# Patient Record
Sex: FEMALE | Race: WHITE | NOT HISPANIC OR LATINO | Employment: OTHER | URBAN - METROPOLITAN AREA
[De-identification: names, ages, dates, MRNs, and addresses within clinical notes are randomized per-mention and may not be internally consistent; named-entity substitution may affect disease eponyms.]

---

## 2017-01-05 ENCOUNTER — TELEPHONE (OUTPATIENT)
Dept: NEUROLOGY | Facility: MEDICAL CENTER | Age: 77
End: 2017-01-05

## 2017-01-05 DIAGNOSIS — R25.1 TREMOR: ICD-10-CM

## 2017-01-05 RX ORDER — PROPRANOLOL HYDROCHLORIDE 120 MG/1
120 CAPSULE, EXTENDED RELEASE ORAL DAILY
Qty: 30 CAP | Refills: 11 | Status: SHIPPED | OUTPATIENT
Start: 2017-01-05 | End: 2017-05-15 | Stop reason: SDUPTHER

## 2017-01-05 NOTE — TELEPHONE ENCOUNTER
Please call the patient and tell her I spoke with her cardiologist, we can substitute Inderal  mg daily for the Toprol-XL 75 mg tablets. A prescription is at the pharmacy, tell her simply to start the Inderal and stop Toprol that very day. JS      Called and spoke with pt. She verbally understood and will comply with all given. LUMA

## 2017-02-13 ENCOUNTER — ANTICOAGULATION VISIT (OUTPATIENT)
Dept: VASCULAR LAB | Facility: MEDICAL CENTER | Age: 77
End: 2017-02-13
Attending: NURSE PRACTITIONER
Payer: MEDICARE

## 2017-02-13 DIAGNOSIS — I48.91 ATRIAL FIBRILLATION, UNSPECIFIED TYPE (HCC): ICD-10-CM

## 2017-02-13 DIAGNOSIS — Z86.73 HISTORY OF EMBOLIC STROKE: ICD-10-CM

## 2017-02-13 LAB
INR BLD: 3.6 (ref 0.9–1.2)
INR PPP: 3.6 (ref 2–3.5)

## 2017-02-13 PROCEDURE — 99212 OFFICE O/P EST SF 10 MIN: CPT | Performed by: NURSE PRACTITIONER

## 2017-02-13 PROCEDURE — 85610 PROTHROMBIN TIME: CPT

## 2017-02-13 NOTE — PROGRESS NOTES
Anticoagulation Summary as of 2/13/2017     INR goal 2.0-3.0   Selected INR 3.6! (2/13/2017)   Maintenance plan 4 mg (4 mg x 1) every day   Weekly total 28 mg   Plan last modified John Moore, PHARMD (10/17/2016)   Next INR check 4/10/2017   Target end date Indefinite    Indications   History of embolic stroke [Z86.79]  Atrial fibrillation (CMS-HCC) [I48.91]         Anticoagulation Episode Summary     [facility] check location [facility]    Preferred lab     Send INR reminders to     Comments       Anticoagulation Care Providers     Provider Role Specialty Phone number    Anuj Denise M.D. Referring Cardiology [phone]    [facility]   [phone]        Patient is supratherapeutic today. She ran out of 4 mg tablets and has been taking 5 mg daily. She now has 4 mg tablets at home. Denies any medication or diet changes. No current symptoms of bleeding or thrombosis reported. HOLD tonight then resume previous regimen of 4 mg daily. Follow up in 8 weeks per pt's preference.    Next Appointment: Monday, April 10, 2017 at   3:00 pm.    Arabella CHANEY

## 2017-02-13 NOTE — MR AVS SNAPSHOT
Aria Solis   2017 3:00 PM   Anticoagulation Visit   MRN: [id number]    Department:  Vascular Medicine   Dept Phone:  [phone]    Description:  Female : 1940   Provider:  [facility] EXAM 5           Allergies as of 2017     Allergen Noted Reactions    Darvon [Propoxyphene Hcl] 10/06/2010       Gluten Meal 01/10/2016         You were diagnosed with     Atrial fibrillation, unspecified type (CMS-HCC)   [4569193]       History of embolic stroke   [691921]         Vital Signs     Smoking Status                   Former Smoker           Basic Information     Date Of Birth Sex Race Ethnicity Preferred Language    1940 Female White Non- English      Your appointments     Apr 10, 2017  3:00 PM   Established Patient with [facility] EXAM 4   [facility]  (--)    [address] [facility] [address]   [phone]            May 08, 2017 12:45 PM   FOLLOW UP with Anuj Denise M.D.   [facility]    [address]  [facility] [id number] [phone]              Problem List              ICD-10-CM Priority Class Noted - Resolved    Insomnia G47.00 Low  Unknown - Present    Osteoarthritis of more than one site M15.9 Low  Unknown - Present    Neuropathy (CMS-[facility]) G62.9 Low  2011 - Present    Radicular pain- L1 nerve root impingement M54.10 Medium  2012 - Present    Atrial fibrillation (CMS-HCC) I48.91 High  2014 - Present    Coronary atherosclerosis of native coronary artery I25.10 High  2014 - Present    Moderate mitral regurgitation I34.0 Medium  2014 - Present    Aortic valve sclerosis I35.8 High  2014 - Present    Intractable migraine without aura G43.019   Unknown - Present    History of embolic stroke Z86.79 Medium  2014 - Present    Mixed hyperlipidemia E78.2 High  2014 - Present    Chronic anticoagulation Z79.01 High   6/19/2014 - Present    Lumbosacral spondylosis without myelopathy M47.817   11/18/2014 - Present    Localized adiposity of abdomen E65   7/5/2015 - Present    ANDRE on CPAP G47.33 Medium  8/27/2015 - Present    CKD (chronic kidney disease) stage 3, GFR 30-59 ml/min (Chronic) N18.3 Medium  10/12/2015 - Present    Subclinical hypothyroidism E03.9 Low  10/12/2015 - Present    Chronically dry eyes H04.129   10/12/2015 - Present    Macular degeneration of both eyes H35.30   10/12/2015 - Present    Medical home Z78.9   Unknown - Present    Edema R60.9 Medium  4/27/2016 - Present    Chronic systolic congestive heart failure (CMS-[facility]) I50.22 Medium  5/2/2016 - Present    Obesity (BMI 30-39.9) E66.9   5/2/2016 - Present    Nausea R11.0   10/6/2016 - Present    Tremor R25.1   10/6/2016 - Present      Health Maintenance        Date Due Completion Dates    IMM DTaP/Tdap/Td Vaccine (1 - Tdap) 7/4/1959 ---    IMM ZOSTER VACCINE 7/4/2000 ---    BONE DENSITY 7/4/2005 ---    MAMMOGRAM 11/22/2017 11/22/2016, 1/8/2014, 1/8/2014, 1/8/2014, 12/12/2013    COLONOSCOPY 4/10/2018 4/10/2015            Results     POCT Protime      Component    INR    3.6                        Current Immunizations     13-VALENT PCV PREVNAR 1/21/2016  8:39 AM    Influenza TIV (IM) 9/1/2013, 10/29/2011    Influenza Vaccine Adult HD 10/6/2016, 9/28/2015  3:34 PM, 9/27/2014    Pneumococcal polysaccharide vaccine (PPSV-23) 9/1/2013, 11/29/2011      Below and/or attached are the medications your provider expects you to take. Review all of your home medications and newly ordered medications with your provider and/or pharmacist. Follow medication instructions as directed by your provider and/or pharmacist. Please keep your medication list with you and share with your provider. Update the information when medications are discontinued, doses are changed, or new medications (including over-the-counter products) are added; and carry medication information at all times in  the event of emergency situations     Allergies:  DARVON - (reactions not documented)     GLUTEN MEAL - (reactions not documented)               Medications  Valid as of: February 13, 2017 -  3:09 PM    Generic Name Brand Name Tablet Size Instructions for use    Aspirin (Tablet Delayed Response) aspirin 81 MG Take 1 Tab by mouth every day.        Atorvastatin Calcium (Tab) LIPITOR 10 MG Take 1 Tab by mouth every bedtime.        Beclomethasone Dipropionate (Aero Soln) QVAR 40 MCG/ACT Inhale 1 Puff by mouth 2 Times a Day.        CycloSPORINE (Emulsion) RESTASIS 0.05 % Place 1 Drop in both eyes 2 times a day.        Divalproex Sodium (TABLET SR 24 HR) DEPAKOTE  MG Take 1 Tab by mouth 2 Times a Day.        Furosemide (Tab) LASIX 20 MG Take 1 Tab by mouth every day.        Gabapentin (Tab) NEURONTIN 600 MG Take 1 Tab by mouth 2 times a day.        Multiple Vitamins-Minerals   Take  by mouth every day.        Propranolol HCl (CAPSULE SR 24 HR) INDERAL  MG Take 1 Cap by mouth every day.        Warfarin Sodium (Tab) COUMADIN 4 MG Take one tablet daily as directed by coumadin clinic        .                 Medicines prescribed today were sent to:     [email or web address] - [address] [address]    Phone: [phone] Fax: [phone]    Open 24 Hours?: No    [facility]ACY #[id number] - [place], [place] - [address] [facility]    [address] [facility] [place] [address]    Phone: [phone] Fax: [phone]    Open 24 Hours?: No      Medication refill instructions:       If your prescription bottle indicates you have medication refills left, it is not necessary to call your provider’s office. Please contact your pharmacy and they will refill your medication.    If your prescription bottle indicates you do not have any refills left, you may request refills at any time through one of the following ways: The online [employer] system (except Urgent Care), by calling your  provider’s office, or by asking your pharmacy to contact your provider’s office with a refill request. Medication refills are processed only during regular business hours and may not be available until the next business day. Your provider may request additional information or to have a follow-up visit with you prior to refilling your medication.   *Please Note: Medication refills are assigned a new Rx number when refilled electronically. Your pharmacy may indicate that no refills were authorized even though a new prescription for the same medication is available at the pharmacy. Please request the medicine by name with the pharmacy before contacting your provider for a refill.        Other Notes About Your Plan     Enrolled in Medical [place] at [facility]        Warfarin Dosing Calendar   February 2017 Details    Sun Mon Tue Wed Thu Fri Sat        1               2               3               4                 5               6               7               8               9               10               11                 12               13   3.6   Hold   See details      14      4 mg         15      4 mg         16      4 mg         17      4 mg         18      4 mg           19      4 mg         20      4 mg         21      4 mg         22      4 mg         23      4 mg         24      4 mg         25      4 mg           26      4 mg         27      4 mg         28      4 mg              Date Details   02/13 This INR check   INR: 3.6               How to take your warfarin dose     To take:  4 mg Take 1 of the 4 mg tablets.    Hold Do not take your warfarin dose. See the Details table to the right for additional instructions.                Warfarin Dosing Calendar   March 2017 Details    Sun Mon Tue Wed Thu Fri Sat        1      4 mg         2      4 mg         3      4 mg         4      4 mg           5      4 mg         6      4 mg         7      4 mg         8      4 mg         9      4 mg         10        4 mg         11      4 mg           12      4 mg         13      4 mg         14      4 mg         15      4 mg         16      4 mg         17      4 mg         18      4 mg           19      4 mg         20      4 mg         21      4 mg         22      4 mg         23      4 mg         24      4 mg         25      4 mg           26      4 mg         27      4 mg         28      4 mg         29      4 mg         30      4 mg         31      4 mg           Date Details   No additional details            How to take your warfarin dose     To take:  4 mg Take 1 of the 4 mg tablets.           Warfarin Dosing Calendar   April 2017 Details    Sun Mon Tue Wed Thu Fri Sat           1      4 mg           2      4 mg         3      4 mg         4      4 mg         5      4 mg         6      4 mg         7      4 mg         8      4 mg           9      4 mg         10      4 mg         11               12               13               14               15                 16               17               18               19               20               21               22                 23               24               25               26               27               28               29                 30                      Date Details   No additional details    Date of next INR:  4/10/2017         How to take your warfarin dose     To take:  4 mg Take 1 of the 4 mg tablets.              [employer] Access Code: Activation code not generated  Current [employer] Status: Active

## 2017-03-02 DIAGNOSIS — I48.91 ATRIAL FIBRILLATION, UNSPECIFIED TYPE (HCC): ICD-10-CM

## 2017-04-10 ENCOUNTER — ANTICOAGULATION VISIT (OUTPATIENT)
Dept: VASCULAR LAB | Facility: MEDICAL CENTER | Age: 77
End: 2017-04-10
Attending: INTERNAL MEDICINE
Payer: MEDICARE

## 2017-04-10 DIAGNOSIS — I48.91 ATRIAL FIBRILLATION, UNSPECIFIED TYPE (HCC): ICD-10-CM

## 2017-04-10 DIAGNOSIS — Z86.73 HISTORY OF EMBOLIC STROKE: ICD-10-CM

## 2017-04-10 LAB
INR BLD: 1.7 (ref 0.9–1.2)
INR PPP: 1.7 (ref 2–3.5)

## 2017-04-10 PROCEDURE — 85610 PROTHROMBIN TIME: CPT

## 2017-04-10 PROCEDURE — 99212 OFFICE O/P EST SF 10 MIN: CPT | Performed by: NURSE PRACTITIONER

## 2017-04-10 NOTE — PROGRESS NOTES
Anticoagulation Summary as of 4/10/2017     INR goal 2.0-3.0   Selected INR 1.7! (4/10/2017)   Maintenance plan 7.5 mg (5 mg x 1.5) on Mon; 5 mg (5 mg x 1) all other days   Weekly total 37.5 mg   Plan last modified TORI Jackson (4/10/2017)   Next INR check 4/24/2017   Target end date Indefinite    Indications   History of embolic stroke [Z86.79]  Atrial fibrillation (CMS-HCC) [I48.91]         Anticoagulation Episode Summary     [facility] location [facility]    Preferred lab     Send INR reminders to     Comments       Anticoagulation Care Providers     Provider Role Specialty Phone number    Anuj Denise M.D. Referring Cardiology [phone]    [facility]   [phone]        Patient is subtherapeutic today. She ran out of 4 mg tablets and is using 5 mg tablets - take 5 mg daily. Denies any medication or diet changes and no missed doses. No current symptoms of bleeding or thrombosis reported. Will increase regimen. Follow up in 2 weeks.    Next Appointment: Monday, April 24, 2017 at 4:00 pm.    Arabella CHANEY

## 2017-04-10 NOTE — MR AVS SNAPSHOT
Aria Solis   4/10/2017 4:45 PM   Anticoagulation Visit   MRN: [id number]    Department:  Vascular Medicine   Dept Phone:  [phone]    Description:  Female : 1940   Provider:  [facility] EXAM 4           Allergies as of 4/10/2017     Allergen Noted Reactions    Darvon [Propoxyphene Hcl] 10/06/2010       Gluten Meal 01/10/2016         You were diagnosed with     Atrial fibrillation, unspecified type (CMS-HCC)   [0405829]       History of embolic stroke   [972479]         Vital Signs     Smoking Status                   Former Smoker           Basic Information     Date Of Birth Sex Race Ethnicity Preferred Language    1940 Female White Non- English      Your appointments     2017  4:00 PM   Established Patient with [facility] EXAM 4   [facility]  (--)    [address] [facility] [address]   [phone]            May 08, 2017 12:45 PM   FOLLOW UP with Anuj Denise M.D.   [facility]    [address]  [address]  [facility] [id number] [phone]              Problem List              ICD-10-CM Priority Class Noted - Resolved    Insomnia G47.00 Low  Unknown - Present    Osteoarthritis of more than one site M15.9 Low  Unknown - Present    Neuropathy (CMS-HCC) G62.9 Low  2011 - Present    Radicular pain- L1 nerve root impingement M54.10 Medium  2012 - Present    Atrial fibrillation (CMS-HCC) I48.91 High  2014 - Present    Coronary atherosclerosis of native coronary artery I25.10 High  2014 - Present    Moderate mitral regurgitation I34.0 Medium  2014 - Present    Aortic valve sclerosis I35.8 High  2014 - Present    Intractable migraine without aura G43.019   Unknown - Present    History of embolic stroke Z86.79 Medium  2014 - Present    Mixed hyperlipidemia E78.2 High  2014 - Present    Chronic anticoagulation Z79.01  High  6/19/2014 - Present    Lumbosacral spondylosis without myelopathy M47.817   11/18/2014 - Present    Localized adiposity of abdomen E65   7/5/2015 - Present    ANDRE on CPAP G47.33 Medium  8/27/2015 - Present    CKD (chronic kidney disease) stage 3, GFR 30-59 ml/min (Chronic) N18.3 Medium  10/12/2015 - Present    Subclinical hypothyroidism E03.9 Low  10/12/2015 - Present    Chronically dry eyes H04.129   10/12/2015 - Present    Macular degeneration of both eyes H35.30   10/12/2015 - Present    Medical home Z78.9   Unknown - Present    Edema R60.9 Medium  4/27/2016 - Present    Chronic systolic congestive heart failure (CMS-[facility]) I50.22 Medium  5/2/2016 - Present    Obesity (BMI 30-39.9) E66.9   5/2/2016 - Present    Nausea R11.0   10/6/2016 - Present    Tremor R25.1   10/6/2016 - Present      Health Maintenance        Date Due Completion Dates    IMM DTaP/Tdap/Td Vaccine (1 - Tdap) 7/4/1959 ---    IMM ZOSTER VACCINE 7/4/2000 ---    BONE DENSITY 7/4/2005 ---    MAMMOGRAM 11/22/2017 11/22/2016, 1/8/2014, 1/8/2014, 1/8/2014, 12/12/2013    COLONOSCOPY 4/10/2018 4/10/2015            Results     POCT Protime      Component    INR    1.7                        Current Immunizations     13-VALENT PCV PREVNAR 1/21/2016  8:39 AM    Influenza TIV (IM) 9/1/2013, 10/29/2011    Influenza Vaccine Adult HD 10/6/2016, 9/28/2015  3:34 PM, 9/27/2014    Pneumococcal polysaccharide vaccine (PPSV-23) 9/1/2013, 11/29/2011      Below and/or attached are the medications your provider expects you to take. Review all of your home medications and newly ordered medications with your provider and/or pharmacist. Follow medication instructions as directed by your provider and/or pharmacist. Please keep your medication list with you and share with your provider. Update the information when medications are discontinued, doses are changed, or new medications (including over-the-counter products) are added; and carry medication information at all  times in the event of emergency situations     Allergies:  DARVON - (reactions not documented)     GLUTEN MEAL - (reactions not documented)               Medications  Valid as of: April 10, 2017 -  4:50 PM    Generic Name Brand Name Tablet Size Instructions for use    Aspirin (Tablet Delayed Response) aspirin 81 MG Take 1 Tab by mouth every day.        Atorvastatin Calcium (Tab) LIPITOR 10 MG Take 1 Tab by mouth every bedtime.        Beclomethasone Dipropionate (Aero Soln) QVAR 40 MCG/ACT Inhale 1 Puff by mouth 2 Times a Day.        CycloSPORINE (Emulsion) RESTASIS 0.05 % Place 1 Drop in both eyes 2 times a day.        Divalproex Sodium (TABLET SR 24 HR) DEPAKOTE  MG Take 1 Tab by mouth 2 Times a Day.        Furosemide (Tab) LASIX 20 MG Take 1 Tab by mouth every day.        Gabapentin (Tab) NEURONTIN 600 MG Take 1 Tab by mouth 2 times a day.        Multiple Vitamins-Minerals   Take  by mouth every day.        Propranolol HCl (CAPSULE SR 24 HR) INDERAL  MG Take 1 Cap by mouth every day.        Warfarin Sodium (Tab) COUMADIN 4 MG Take one tablet daily as directed by coumadin clinic        .                 Medicines prescribed today were sent to:     [facility] - [address] [address]    Phone: [phone] Fax: [phone]    Open 24 Hours?: No    [facility]/PHARMACY #[id number] - [place] - [address] [facility] [address] [facility]    [address] [place] [address]    Phone: [phone] Fax: [phone]    Open 24 Hours?: No      Medication refill instructions:       If your prescription bottle indicates you have medication refills left, it is not necessary to call your provider’s office. Please contact your pharmacy and they will refill your medication.    If your prescription bottle indicates you do not have any refills left, you may request refills at any time through one of the following ways: The online [employer] system (except Urgent Care), by calling  your provider’s office, or by asking your pharmacy to contact your provider’s office with a refill request. Medication refills are processed only during regular business hours and may not be available until the next business day. Your provider may request additional information or to have a follow-up visit with you prior to refilling your medication.   *Please Note: Medication refills are assigned a new Rx number when refilled electronically. Your pharmacy may indicate that no refills were authorized even though a new prescription for the same medication is available at the pharmacy. Please request the medicine by name with the pharmacy before contacting your provider for a refill.        Other Notes About Your Plan     Enrolled in Medical [place] at [facility]        Warfarin Dosing Calendar   April 2017 Details    Sun Mon Tue Wed Thu Fri Sat           1                 2               3               4               5               6               7               8                 9               10   1.7   7.5 mg   See details      11      5 mg         12      5 mg         13      5 mg         14      5 mg         15      5 mg           16      5 mg         17      7.5 mg         18      5 mg         19      5 mg         20      5 mg         21      5 mg         22      5 mg           23      5 mg         24      7.5 mg         25               26               27               28               29                 30                      Date Details   04/10 This INR check   INR: 1.7       Date of next INR:  4/24/2017         How to take your warfarin dose     To take:  5 mg Take 1 of the 5 mg tablets.    To take:  7.5 mg Take 1.5 of the 5 mg tablets.              [employer] Access Code: Activation code not generated  Current [employer] Status: Active

## 2017-04-24 ENCOUNTER — ANTICOAGULATION VISIT (OUTPATIENT)
Dept: VASCULAR LAB | Facility: MEDICAL CENTER | Age: 77
End: 2017-04-24
Attending: INTERNAL MEDICINE
Payer: MEDICARE

## 2017-04-24 DIAGNOSIS — I48.91 ATRIAL FIBRILLATION, UNSPECIFIED TYPE (HCC): ICD-10-CM

## 2017-04-24 DIAGNOSIS — Z86.73 HISTORY OF EMBOLIC STROKE: ICD-10-CM

## 2017-04-24 LAB — INR PPP: 4.3 (ref 2–3.5)

## 2017-04-24 PROCEDURE — 85610 PROTHROMBIN TIME: CPT

## 2017-04-24 PROCEDURE — 99212 OFFICE O/P EST SF 10 MIN: CPT | Performed by: NURSE PRACTITIONER

## 2017-04-24 NOTE — MR AVS SNAPSHOT
Aria Solis   2017 4:00 PM   Anticoagulation Visit   MRN: [id number]    Department:  Vascular Medicine   Dept Phone:  [phone]    Description:  Female : 1940   Provider:  [facility] EXAM 4           Allergies as of 2017     Allergen Noted Reactions    Darvon [Propoxyphene Hcl] 10/06/2010       Gluten Meal 01/10/2016         You were diagnosed with     Atrial fibrillation, unspecified type (CMS-HCC)   [9870246]       History of embolic stroke   [538374]         Vital Signs     Smoking Status                   Former Smoker           Basic Information     Date Of Birth Sex Race Ethnicity Preferred Language    1940 Female White Non- English      Your appointments     May 15, 2017  2:15 PM   FOLLOW UP with Anuj Denise M.D.   [facility]    [address]  [address]  [facility] [id number] [phone]            May 15, 2017  4:00 PM   Established Patient with [facility] EXAM 5   [facility] for Heart and Vascular Health  (--)    [address] [facility] [address]   [phone]              Problem List              ICD-10-CM Priority Class Noted - Resolved    Insomnia G47.00 Low  Unknown - Present    Osteoarthritis of more than one site M15.9 Low  Unknown - Present    Neuropathy (CMS-[facility]) G62.9 Low  2011 - Present    Radicular pain- L1 nerve root impingement M54.10 Medium  2012 - Present    Atrial fibrillation (CMS-HCC) I48.91 High  2014 - Present    Coronary atherosclerosis of native coronary artery I25.10 High  2014 - Present    Moderate mitral regurgitation I34.0 Medium  2014 - Present    Aortic valve sclerosis I35.8 High  2014 - Present    Intractable migraine without aura G43.019   Unknown - Present    History of embolic stroke Z86.79 Medium  2014 - Present    Mixed hyperlipidemia E78.2 High  2014 - Present    Chronic anticoagulation Z79.01  High  6/19/2014 - Present    Lumbosacral spondylosis without myelopathy M47.817   11/18/2014 - Present    Localized adiposity of abdomen E65   7/5/2015 - Present    ANDRE on CPAP G47.33 Medium  8/27/2015 - Present    CKD (chronic kidney disease) stage 3, GFR 30-59 ml/min (Chronic) N18.3 Medium  10/12/2015 - Present    Subclinical hypothyroidism E03.9 Low  10/12/2015 - Present    Chronically dry eyes H04.129   10/12/2015 - Present    Macular degeneration of both eyes H35.30   10/12/2015 - Present    Medical home Z78.9   Unknown - Present    Edema R60.9 Medium  4/27/2016 - Present    Chronic systolic congestive heart failure (CMS-[facility]) I50.22 Medium  5/2/2016 - Present    Obesity (BMI 30-39.9) E66.9   5/2/2016 - Present    Nausea R11.0   10/6/2016 - Present    Tremor R25.1   10/6/2016 - Present      Health Maintenance        Date Due Completion Dates    IMM DTaP/Tdap/Td Vaccine (1 - Tdap) 7/4/1959 ---    IMM ZOSTER VACCINE 7/4/2000 ---    BONE DENSITY 7/4/2005 ---    MAMMOGRAM 11/22/2017 11/22/2016, 12/12/2013    COLONOSCOPY 4/10/2018 4/10/2015            Results     POCT Protime      Component    INR    4.3                        Current Immunizations     13-VALENT PCV PREVNAR 1/21/2016  8:39 AM    Influenza TIV (IM) 9/1/2013, 10/29/2011    Influenza Vaccine Adult HD 10/6/2016, 9/28/2015  3:34 PM, 9/27/2014    Pneumococcal polysaccharide vaccine (PPSV-23) 9/1/2013, 11/29/2011      Below and/or attached are the medications your provider expects you to take. Review all of your home medications and newly ordered medications with your provider and/or pharmacist. Follow medication instructions as directed by your provider and/or pharmacist. Please keep your medication list with you and share with your provider. Update the information when medications are discontinued, doses are changed, or new medications (including over-the-counter products) are added; and carry medication information at all times in the event of emergency  situations     Allergies:  DARVON - (reactions not documented)     GLUTEN MEAL - (reactions not documented)               Medications  Valid as of: April 24, 2017 -  4:19 PM    Generic Name Brand Name Tablet Size Instructions for use    Aspirin (Tablet Delayed Response) aspirin 81 MG Take 1 Tab by mouth every day.        Atorvastatin Calcium (Tab) LIPITOR 10 MG Take 1 Tab by mouth every bedtime.        Beclomethasone Dipropionate (Aero Soln) QVAR 40 MCG/ACT Inhale 1 Puff by mouth 2 Times a Day.        CycloSPORINE (Emulsion) RESTASIS 0.05 % Place 1 Drop in both eyes 2 times a day.        Divalproex Sodium (TABLET SR 24 HR) DEPAKOTE  MG Take 1 Tab by mouth 2 Times a Day.        Furosemide (Tab) LASIX 20 MG Take 1 Tab by mouth every day.        Gabapentin (Tab) NEURONTIN 600 MG Take 1 Tab by mouth 2 times a day.        Multiple Vitamins-Minerals   Take  by mouth every day.        Propranolol HCl (CAPSULE SR 24 HR) INDERAL  MG Take 1 Cap by mouth every day.        Warfarin Sodium (Tab) COUMADIN 4 MG Take one tablet daily as directed by coumadin clinic        .                 Medicines prescribed today were sent to:     [facility] [address] [address]    Phone: [phone] Fax: [phone]    Open 24 Hours?: No    [facility]/PHARMACY #[id number] - [place] [address]    [address] [place] [address]    Phone: [phone] Fax: [phone]    Open 24 Hours?: No      Medication refill instructions:       If your prescription bottle indicates you have medication refills left, it is not necessary to call your provider’s office. Please contact your pharmacy and they will refill your medication.    If your prescription bottle indicates you do not have any refills left, you may request refills at any time through one of the following ways: The online [facility] system (except Urgent Care), by calling your provider’s office, or by  asking your pharmacy to contact your provider’s office with a refill request. Medication refills are processed only during regular business hours and may not be available until the next business day. Your provider may request additional information or to have a follow-up visit with you prior to refilling your medication.   *Please Note: Medication refills are assigned a new Rx number when refilled electronically. Your pharmacy may indicate that no refills were authorized even though a new prescription for the same medication is available at the pharmacy. Please request the medicine by name with the pharmacy before contacting your provider for a refill.        Other Notes About Your Plan     Enrolled in Medical [facility]        Warfarin Dosing Calendar   April 2017 Details    Sun Mon Tue Wed Thu Fri Sat           1                 2               3               4               5               6               7               8                 9               10               11               12               13               14               15                 16               17               18               19               20               21               22                 23               24   4.3   Hold   See details      25      5 mg         26      5 mg         27      5 mg         28      5 mg         29      5 mg           30      5 mg                Date Details   04/24 This INR check   INR: 4.3               How to take your warfarin dose     To take:  5 mg Take 1 of the 5 mg tablets.    Hold Do not take your warfarin dose. See the Details table to the right for additional instructions.                Warfarin Dosing Calendar   May 2017 Details    Sun Mon Tue Wed Thu Fri Sat      1      5 mg         2      5 mg         3      5 mg         4      5 mg         5      5 mg         6      5 mg           7      5 mg         8      5 mg         9      5 mg         10      5 mg         11      5 mg          12      5 mg         13      5 mg           14      5 mg         15      5 mg         16               17               18               19               20                 21               22               23               24               25               26               27                 28               29               30               31                   Date Details   No additional details    Date of next INR:  5/15/2017         How to take your warfarin dose     To take:  5 mg Take 1 of the 5 mg tablets.              [facility] Access Code: Activation code not generated  Current [facility] Status: Active

## 2017-04-24 NOTE — PROGRESS NOTES
Anticoagulation Summary as of 4/24/2017     INR goal 2.0-3.0   Selected INR 4.3! (4/24/2017)   Maintenance plan 5 mg (5 mg x 1) every day   Weekly total 35 mg   Plan last modified TORI Jackson (4/24/2017)   Next INR check 5/15/2017   Target end date Indefinite    Indications   History of embolic stroke [Z86.79]  Atrial fibrillation (CMS-HCC) [I48.91]         Anticoagulation Episode Summary     INR check location [facility]    Preferred lab     Send INR reminders to     Comments       Anticoagulation Care Providers     Provider Role Specialty Phone number    Anuj Denise M.D. Referring Cardiology [phone]    [facility]   [phone]        Patient is supratherapeutic today. She has been taking Aleve PRN for pain. Recommended acetaminophen. Denies any diet changes and no alcohol. Confirmed current regimen. No current symptoms of bleeding or thrombosis reported. Will decrease regimen. Follow up in 3 weeks per pt's preference.    Next Appointment: Monday, May 15, 2017 at 4:00 pm.     Arabella CHANEY

## 2017-04-27 LAB — INR BLD: 4.3 (ref 0.9–1.2)

## 2017-05-15 ENCOUNTER — ANTICOAGULATION VISIT (OUTPATIENT)
Dept: VASCULAR LAB | Facility: MEDICAL CENTER | Age: 77
End: 2017-05-15
Attending: INTERNAL MEDICINE
Payer: MEDICARE

## 2017-05-15 ENCOUNTER — OFFICE VISIT (OUTPATIENT)
Dept: CARDIOLOGY | Facility: MEDICAL CENTER | Age: 77
End: 2017-05-15
Payer: MEDICARE

## 2017-05-15 VITALS
BODY MASS INDEX: 31.08 KG/M2 | HEIGHT: 67 IN | SYSTOLIC BLOOD PRESSURE: 98 MMHG | DIASTOLIC BLOOD PRESSURE: 68 MMHG | WEIGHT: 198 LBS | HEART RATE: 76 BPM | OXYGEN SATURATION: 89 %

## 2017-05-15 DIAGNOSIS — Z86.73 HISTORY OF EMBOLIC STROKE: ICD-10-CM

## 2017-05-15 DIAGNOSIS — E78.2 MIXED HYPERLIPIDEMIA: ICD-10-CM

## 2017-05-15 DIAGNOSIS — I48.20 CHRONIC ATRIAL FIBRILLATION (HCC): ICD-10-CM

## 2017-05-15 DIAGNOSIS — R25.1 TREMOR: ICD-10-CM

## 2017-05-15 LAB
INR BLD: 2 (ref 0.9–1.2)
INR PPP: 2 (ref 2–3.5)

## 2017-05-15 PROCEDURE — 99211 OFF/OP EST MAY X REQ PHY/QHP: CPT

## 2017-05-15 PROCEDURE — G8419 CALC BMI OUT NRM PARAM NOF/U: HCPCS | Performed by: INTERNAL MEDICINE

## 2017-05-15 PROCEDURE — 1101F PT FALLS ASSESS-DOCD LE1/YR: CPT | Mod: 8P | Performed by: INTERNAL MEDICINE

## 2017-05-15 PROCEDURE — 4040F PNEUMOC VAC/ADMIN/RCVD: CPT | Performed by: INTERNAL MEDICINE

## 2017-05-15 PROCEDURE — 1036F TOBACCO NON-USER: CPT | Performed by: INTERNAL MEDICINE

## 2017-05-15 PROCEDURE — 85610 PROTHROMBIN TIME: CPT

## 2017-05-15 PROCEDURE — G8598 ASA/ANTIPLAT THER USED: HCPCS | Performed by: INTERNAL MEDICINE

## 2017-05-15 PROCEDURE — G8432 DEP SCR NOT DOC, RNG: HCPCS | Performed by: INTERNAL MEDICINE

## 2017-05-15 PROCEDURE — 99214 OFFICE O/P EST MOD 30 MIN: CPT | Performed by: INTERNAL MEDICINE

## 2017-05-15 RX ORDER — LISINOPRIL 10 MG/1
10 TABLET ORAL DAILY
Qty: 120 TAB | Refills: 3 | Status: SHIPPED | OUTPATIENT
Start: 2017-05-15 | End: 2017-10-18 | Stop reason: SDUPTHER

## 2017-05-15 RX ORDER — WARFARIN SODIUM 4 MG/1
TABLET ORAL
Qty: 120 TAB | Refills: 3 | Status: SHIPPED | OUTPATIENT
Start: 2017-05-15 | End: 2017-05-26

## 2017-05-15 RX ORDER — PROPRANOLOL HYDROCHLORIDE 120 MG/1
120 CAPSULE, EXTENDED RELEASE ORAL DAILY
Qty: 120 CAP | Refills: 3 | Status: SHIPPED | OUTPATIENT
Start: 2017-05-15 | End: 2017-10-18 | Stop reason: SDUPTHER

## 2017-05-15 RX ORDER — ATORVASTATIN CALCIUM 10 MG/1
10 TABLET, FILM COATED ORAL
Qty: 120 TAB | Refills: 3 | Status: SHIPPED | OUTPATIENT
Start: 2017-05-15 | End: 2017-10-18 | Stop reason: SDUPTHER

## 2017-05-15 ASSESSMENT — ENCOUNTER SYMPTOMS
TREMORS: 1
BACK PAIN: 1
DEPRESSION: 0
PND: 0
BRUISES/BLEEDS EASILY: 0
HEARTBURN: 0
BLURRED VISION: 1
PALPITATIONS: 1
NAUSEA: 0
COUGH: 0
DIZZINESS: 0
HEADACHES: 0
MYALGIAS: 0
NERVOUS/ANXIOUS: 0
FEVER: 0
CHILLS: 0
SHORTNESS OF BREATH: 0
EYE DISCHARGE: 0

## 2017-05-15 NOTE — MR AVS SNAPSHOT
"        Aria Solis   5/15/2017 2:15 PM   Office Visit   MRN: [id number]    Department:  Heart [facility]   Dept Phone:  [phone]    Description:  Female : 1940   Provider:  Anuj Denise M.D.           Allergies as of 5/15/2017     Allergen Noted Reactions    Darvon [Propoxyphene Hcl] 10/06/2010       Gluten Meal 01/10/2016         You were diagnosed with     Chronic atrial fibrillation (CMS-[facility])   [983787]       History of embolic stroke   [644143]       Tremor   [672066]       Mixed hyperlipidemia   [272.2.ICD-9-CM]         Vital Signs     Blood Pressure Pulse Height Weight Body Mass Index Oxygen Saturation    98/68 mmHg 76 1.689 m (5' 6.5\") 89.812 kg (198 lb) 31.48 kg/m2 89%    Smoking Status                   Former Smoker           Basic Information     Date Of Birth Sex Race Ethnicity Preferred Language    1940 Female White Non- English      Your appointments     May 15, 2017  4:00 PM   Established Patient with [facility] EXAM 5   [facility] for Heart and Vascular Health  (--)    [address] [place] [address]   [phone]            Oct 18, 2017 10:15 AM   ECHO with [facility], [facility] EXAM 12   [facility]ARD[facility]OLOGY [facility] ([facility])    [address] [place] [address]   [phone]           No prep              Problem List              ICD-10-CM Priority Class Noted - Resolved    Insomnia G47.00 Low  Unknown - Present    Osteoarthritis of more than one site M15.9 Low  Unknown - Present    Neuropathy (CMS-[facility]) G62.9 Low  2011 - Present    Radicular pain- L1 nerve root impingement M54.10 Medium  2012 - Present    Atrial fibrillation (CMS-[facility]) I48.91 High  2014 - Present    Coronary atherosclerosis of native coronary artery I25.10 High  2014 - Present    Moderate mitral regurgitation I34.0 Medium  2014 - Present    Aortic valve sclerosis I35.8 High  2014 - Present    Intractable migraine without aura G43.019   Unknown - " Present    History of embolic stroke Z86.79 Medium  6/18/2014 - Present    Mixed hyperlipidemia E78.2 High  6/18/2014 - Present    Chronic anticoagulation Z79.01 High  6/19/2014 - Present    Lumbosacral spondylosis without myelopathy M47.817   11/18/2014 - Present    Localized adiposity of abdomen E65   7/5/2015 - Present    ANDRE on CPAP G47.33, Z99.89 Medium  8/27/2015 - Present    CKD (chronic kidney disease) stage 3, GFR 30-59 ml/min (Chronic) N18.3 Medium  10/12/2015 - Present    Subclinical hypothyroidism E03.9 Low  10/12/2015 - Present    Chronically dry eyes H04.129   10/12/2015 - Present    Macular degeneration of both eyes H35.30   10/12/2015 - Present    Medical home Z78.9   Unknown - Present    Edema R60.9 Medium  4/27/2016 - Present    Chronic systolic congestive heart failure (CMS-[facility]) I50.22 Medium  5/2/2016 - Present    Obesity (BMI 30-39.9) E66.9   5/2/2016 - Present    Nausea R11.0   10/6/2016 - Present    Tremor R25.1   10/6/2016 - Present      Health Maintenance        Date Due Completion Dates    IMM DTaP/Tdap/Td Vaccine (1 - Tdap) 7/4/1959 ---    IMM ZOSTER VACCINE 7/4/2000 ---    BONE DENSITY 7/4/2005 ---    MAMMOGRAM 11/22/2017 11/22/2016, 12/12/2013    COLONOSCOPY 4/10/2018 4/10/2015            Current Immunizations     13-VALENT PCV PREVNAR 1/21/2016  8:39 AM    Influenza TIV (IM) 9/1/2013, 10/29/2011    Influenza Vaccine Adult HD 10/6/2016, 9/28/2015  3:34 PM, 9/27/2014    Pneumococcal polysaccharide vaccine (PPSV-23) 9/1/2013, 11/29/2011      Below and/or attached are the medications your provider expects you to take. Review all of your home medications and newly ordered medications with your provider and/or pharmacist. Follow medication instructions as directed by your provider and/or pharmacist. Please keep your medication list with you and share with your provider. Update the information when medications are discontinued, doses are changed, or new medications (including over-the-counter  products) are added; and carry medication information at all times in the event of emergency situations     Allergies:  DARVON - (reactions not documented)     GLUTEN MEAL - (reactions not documented)               Medications  Valid as of: May 15, 2017 -  3:06 PM    Generic Name Brand Name Tablet Size Instructions for use    Aspirin (Tablet Delayed Response) aspirin 81 MG Take 1 Tab by mouth every day.        Atorvastatin Calcium (Tab) LIPITOR 10 MG Take 1 Tab by mouth every bedtime.        Beclomethasone Dipropionate (Aero Soln) QVAR 40 MCG/ACT Inhale 1 Puff by mouth 2 Times a Day.        CycloSPORINE (Emulsion) RESTASIS 0.05 % Place 1 Drop in both eyes 2 times a day.        Divalproex Sodium (TABLET SR 24 HR) DEPAKOTE  MG Take 1 Tab by mouth 2 Times a Day.        Furosemide (Tab) LASIX 20 MG Take 1 Tab by mouth every day.        Gabapentin (Tab) NEURONTIN 600 MG Take 1 Tab by mouth 2 times a day.        Lisinopril (Tab) PRINIVIL 10 MG Take 1 Tab by mouth every day.        Multiple Vitamins-Minerals   Take  by mouth every day.        Propranolol HCl (CAPSULE SR 24 HR) INDERAL  MG Take 1 Cap by mouth every day.        Warfarin Sodium (Tab) COUMADIN 4 MG Take one tablet daily as directed by coumadin clinic        .                 Medicines prescribed today were sent to:     [facility] - [address] [address]    Phone: [phone] Fax: [phone]    Open 24 Hours?: No    [facility]ARMACY #[id number] - [place] [address]    [address] [place] [address]    Phone: [phone] Fax: [phone]    Open 24 Hours?: No      Medication refill instructions:       If your prescription bottle indicates you have medication refills left, it is not necessary to call your provider’s office. Please contact your pharmacy and they will refill your medication.    If your prescription bottle indicates you do not have any refills left,  you may request refills at any time through one of the following ways: The online [employer] system (except Urgent Care), by calling your provider’s office, or by asking your pharmacy to contact your provider’s office with a refill request. Medication refills are processed only during regular business hours and may not be available until the next business day. Your provider may request additional information or to have a follow-up visit with you prior to refilling your medication.   *Please Note: Medication refills are assigned a new Rx number when refilled electronically. Your pharmacy may indicate that no refills were authorized even though a new prescription for the same medication is available at the pharmacy. Please request the medicine by name with the pharmacy before contacting your provider for a refill.        Your To Do List     Future Labs/Procedures Complete By Expires    Echocardiogram Comp w/o Cont  10/18/2017 5/15/2018      Other Notes About Your Plan     Enrolled in [facility] Access Code: Activation code not generated  Current [facility] Status: Active

## 2017-05-15 NOTE — PROGRESS NOTES
Anticoagulation Summary as of 5/15/2017     INR goal 2.0-3.0   Selected INR 2.0 (5/15/2017)   Maintenance plan 5 mg (5 mg x 1) every day   Weekly total 35 mg   Plan last modified TORI Jackson (4/24/2017)   Next INR check 5/26/2017   Target end date Indefinite    Indications   History of embolic stroke [Z86.79]  Atrial fibrillation (CMS-HCC) [I48.91]         Anticoagulation Episode Summary     INR check location [facility]    Preferred lab     Send INR reminders to     Comments       Anticoagulation Care Providers     Provider Role Specialty Phone number    Anuj Denise M.D. Referring Cardiology [phone]    [facility]   [phone]        Anticoagulation Patient Findings    A/P: Saw patient in clinic today. INR is therapeutic today after a supratherapeutic INR last visit. Patient denies any s/sxs of bleeding, medication changes, or changes in diet. Instructed patient to continue current warfarin regimen as outlined above. Confirmed dosing regimen with patient. F/U INR in ~2 weeks (Friday 5/26/17 @1:30pm in clinic).  Patient is leaving for an extended vacation in [place] on May 29th, 2017. Therefore, scheduled a appointment prior to vacation to ensure patient is within therapeutic range with Warfarin 5mg po daily. Patient will be on vacation until October 2017.     Anuja Regalado, SHAWND

## 2017-05-15 NOTE — Clinical Note
[facility]   [address]   [address]  [place] [address]  Phone: [phone]  Fax: [phone]              Aria Solis  1940    Encounter Date: 5/15/2017    Anuj Denise M.D.          PROGRESS NOTE:  Subjective:   Aria Solis is a 76 y.o. female who presents today in follow-up for atrial fibrillation with mild cardiomyopathy. She is also on anticoagulants.  Clinically she feels well.  She saw a neurologist for her tremor. We agreed to stop the metoprolol and troponins Inderal  mg per day both for rate control and tremor.  She does not think her tremors any better.  She's had no pedal edema.  She is taking warfarin.  She and her  are going on a four-month road trip to [place]  No other new issues.    Past Medical History   Diagnosis Date   • Postmenopausal    • Migraine without aura, with intractable migraine, so stated, without mention of status migrainosus    • Insomnia    • Hyperlipemia    • Osteoarthritis of more than one site    • Atrial fibrillation (CMS-[facility]) February 2014     New onset atrial fibrillation.   • CAD (coronary artery disease) February 2014     ACS. Coronary angiogram with normal LVEF, proximal LAD 40-50% stenosis, distal LAD 40% stenosis.   • Cerebellar stroke, acute (CMS-[facility]) February 2014     CT scan with acute cerebellar stroke, managed by neurology.   • Chronic anticoagulation    • Back pain    • H/O total knee replacement 1996     Bilateral   • Stroke (CMS-HCC)    • Cholesterol blood decreased    • Medical home    • Sleep apnea      On CPAP     Past Surgical History   Procedure Laterality Date   • Knee replacement, total  Feb/April 1996     Bilateral   • Other orthopedic surgery  February 2012     Spinal stimulator   • Other orthopedic surgery  2004     Right ankle surgery   • Lumbar laminectomy diskectomy  1975/1984     L5 and L6 laminectomy   • Neuro dest facet l/s  w/ig sngl  11/18/2014     Performed by N[facility]omeli at [facility]S   • Neuro dest facet l/s w/ig addl  11/18/2014     Performed by Nelly Lomeli at [facility] ORS   • Neuro dest facet l/s w/ig addl  11/18/2014     Performed by Nelly Lomeli at [facility] ORS     Family History   Problem Relation Age of Onset   • Other Father      trauma   • Heart Disease Neg Hx    • Heart Attack Neg Hx    • Heart Failure Neg Hx    • Hyperlipidemia Neg Hx      History   Smoking status   • Former Smoker -- 3.00 packs/day for 10 years   • Types: Cigarettes   • Quit date: 09/22/1968   Smokeless tobacco   • Never Used     Allergies   Allergen Reactions   • Darvon [Propoxyphene Hcl]    • Gluten Meal      Outpatient Encounter Prescriptions as of 5/15/2017   Medication Sig Dispense Refill   • warfarin (COUMADIN) 4 MG Tab Take one tablet daily as directed by coumadin clinic 120 Tab 3   • propranolol CR (INDERAL LA) 120 MG CAPSULE SR 24 HR Take 1 Cap by mouth every day. 120 Cap 3   • atorvastatin (LIPITOR) 10 MG Tab Take 1 Tab by mouth every bedtime. 120 Tab 3   • lisinopril (PRINIVIL) 10 MG Tab Take 1 Tab by mouth every day. 120 Tab 3   • cyclosporin (RESTASIS) 0.05 % ophthalmic emulsion Place 1 Drop in both eyes 2 times a day. 3 Each 3   • divalproex ER (DEPAKOTE ER) 500 MG TABLET SR 24 HR Take 1 Tab by mouth 2 Times a Day. 180 Tab 3   • gabapentin (NEURONTIN) 600 MG tablet Take 1 Tab by mouth 2 times a day. 180 Tab 3   • furosemide (LASIX) 20 MG Tab Take 1 Tab by mouth every day. 90 Tab 3   • aspirin EC 81 MG EC tablet Take 1 Tab by mouth every day. 30 Tab 1   • beclomethasone (QVAR) 40 MCG/ACT inhaler Inhale 1 Puff by mouth 2 Times a Day. (Patient taking differently: Inhale 1 Puff by mouth as needed.) 40 Inhaler 3   • Multiple Vitamins-Minerals (OCUVITE ADULT 50+ PO) Take  by mouth every day.     • [DISCONTINUED] propranolol CR (INDERAL LA) 120 MG CAPSULE SR 24 HR Take 1 Cap by mouth every day. 30 Cap 11   •  "[DISCONTINUED] warfarin (COUMADIN) 4 MG Tab Take one tablet daily as directed by coumadin clinic 90 Tab 1   • [DISCONTINUED] atorvastatin (LIPITOR) 10 MG Tab Take 1 Tab by mouth every bedtime. 90 Tab 3     No facility-administered encounter medications on file as of 5/15/2017.     Review of Systems   Constitutional: Negative for fever, chills and malaise/fatigue.   Eyes: Positive for blurred vision. Negative for discharge.   Respiratory: Negative for cough and shortness of breath.    Cardiovascular: Positive for palpitations. Negative for chest pain, leg swelling and PND.   Gastrointestinal: Negative for heartburn and nausea.   Genitourinary: Negative for dysuria and urgency.   Musculoskeletal: Positive for back pain. Negative for myalgias.   Skin: Negative for itching and rash.   Neurological: Positive for tremors. Negative for dizziness and headaches.   Endo/Heme/Allergies: Negative for environmental allergies. Does not bruise/bleed easily.   Psychiatric/Behavioral: Negative for depression. The patient is not nervous/anxious.         Objective:   BP 98/68 mmHg  Pulse 76  Ht 1.689 m (5' 6.5\")  Wt 89.812 kg (198 lb)  BMI 31.48 kg/m2  SpO2 89%    Physical Exam   Constitutional: She is oriented to person, place, and time. She appears well-developed and well-nourished.   HENT:   Head: Normocephalic.   Mouth/Throat: Oropharynx is clear and moist.   Eyes: Conjunctivae are normal. No scleral icterus.   Neck: No JVD present. No thyromegaly present.   Cardiovascular: Normal rate.  An irregularly irregular rhythm present.   Heart rate 75 at rest   Pulmonary/Chest: She has no wheezes. She has no rales (right lower lung field). She exhibits no tenderness.   Musculoskeletal: She exhibits no edema or tenderness.   Neurological: She is alert and oriented to person, place, and time.   Skin: Skin is warm and dry. She is not diaphoretic.   Psychiatric: She has a normal mood and affect. Thought content normal.       Assessment:  "     1. Chronic atrial fibrillation (CMS-HCC)  warfarin (COUMADIN) 4 MG Tab    lisinopril (PRINIVIL) 10 MG Tab    Echocardiogram Comp w/o Cont   2. History of embolic stroke     3. Tremor  propranolol CR (INDERAL LA) 120 MG CAPSULE SR 24 HR   4. Mixed hyperlipidemia  atorvastatin (LIPITOR) 10 MG Tab       Medical Decision Making:  Today's Assessment / Status / Plan:   There is some incompleteness in her medication list.  She may or may not be taking lisinopril 10 mg per day.  I have refilled all of her medications for their long road trip.  I ordered echocardiogram for October and will see her thereafter        Jet Sarkar M.D.  [address]  [address]  [place] [address]  VIA In Basket

## 2017-05-15 NOTE — MR AVS SNAPSHOT
Aria Ponceespie   5/15/2017 4:00 PM   Anticoagulation Visit   MRN: [id number]    Department:  Vascular Medicine   Dept Phone:  [phone]    Description:  Female : 1940   Provider:  [facility] EXAM 5           Allergies as of 5/15/2017     Allergen Noted Reactions    Darvon [Propoxyphene Hcl] 10/06/2010       Gluten Meal 01/10/2016         You were diagnosed with     History of embolic stroke   [[id number]]         Vital Signs     Smoking Status                   Former Smoker           Basic Information     Date Of Birth Sex Race Ethnicity Preferred Language    1940 Female White Non- English      Your appointments     May 15, 2017  4:00 PM   Established Patient with [facility] EXAM 5   [facility]  (--)    [address] [facility] [address]   [phone]            May 26, 2017  1:30 PM   Established Patient with [facility] EXAM 4   [facility]  (--)    [address] [address]   [phone]            Oct 18, 2017 10:15 AM   ECHO with ECHO [facility], [facility] EXAM 12   ECHOCARDIOLOGY [facility] ([facility])    [address] [facility] [place] [address]   [phone]           No prep              Problem List              ICD-10-CM Priority Class Noted - Resolved    Insomnia G47.00 Low  Unknown - Present    Osteoarthritis of more than one site M15.9 Low  Unknown - Present    Neuropathy (CMS-HCC) G62.9 Low  2011 - Present    Radicular pain- L1 nerve root impingement M54.10 Medium  2012 - Present    Atrial fibrillation (CMS-HCC) I48.91 High  2014 - Present    Coronary atherosclerosis of native coronary artery I25.10 High  2014 - Present    Moderate mitral regurgitation I34.0 Medium  2014 - Present    Aortic valve sclerosis I35.8 High  2014 - Present    Intractable migraine without aura G43.019   Unknown - Present    History of embolic stroke Z86.79 Medium  2014 - Present    Mixed hyperlipidemia E78.2 High  6/18/2014 - Present    Chronic anticoagulation Z79.01 High  6/19/2014 - Present    Lumbosacral spondylosis without myelopathy M47.817   11/18/2014 - Present    Localized adiposity of abdomen E65   7/5/2015 - Present    ANDRE on CPAP G47.33, Z99.89 Medium  8/27/2015 - Present    CKD (chronic kidney disease) stage 3, GFR 30-59 ml/min (Chronic) N18.3 Medium  10/12/2015 - Present    Subclinical hypothyroidism E03.9 Low  10/12/2015 - Present    Chronically dry eyes H04.129   10/12/2015 - Present    Macular degeneration of both eyes H35.30   10/12/2015 - Present    Medical home Z78.9   Unknown - Present    Edema R60.9 Medium  4/27/2016 - Present    Chronic systolic congestive heart failure (CMS-[facility]) I50.22 Medium  5/2/2016 - Present    Obesity (BMI 30-39.9) E66.9   5/2/2016 - Present    Nausea R11.0   10/6/2016 - Present    Tremor R25.1   10/6/2016 - Present      Health Maintenance        Date Due Completion Dates    IMM DTaP/Tdap/Td Vaccine (1 - Tdap) 7/4/1959 ---    IMM ZOSTER VACCINE 7/4/2000 ---    BONE DENSITY 7/4/2005 ---    MAMMOGRAM 11/22/2017 11/22/2016, 12/12/2013    COLONOSCOPY 4/10/2018 4/10/2015            Results     POCT Protime      Component    INR    2.0                        Current Immunizations     13-VALENT PCV PREVNAR 1/21/2016  8:39 AM    Influenza TIV (IM) 9/1/2013, 10/29/2011    Influenza Vaccine Adult HD 10/6/2016, 9/28/2015  3:34 PM, 9/27/2014    Pneumococcal polysaccharide vaccine (PPSV-23) 9/1/2013, 11/29/2011      Below and/or attached are the medications your provider expects you to take. Review all of your home medications and newly ordered medications with your provider and/or pharmacist. Follow medication instructions as directed by your provider and/or pharmacist. Please keep your medication list with you and share with your provider. Update the information when medications are discontinued, doses are changed, or new medications (including  over-the-counter products) are added; and carry medication information at all times in the event of emergency situations     Allergies:  DARVON - (reactions not documented)     GLUTEN MEAL - (reactions not documented)               Medications  Valid as of: May 15, 2017 -  3:37 PM    Generic Name Brand Name Tablet Size Instructions for use    Aspirin (Tablet Delayed Response) aspirin 81 MG Take 1 Tab by mouth every day.        Atorvastatin Calcium (Tab) LIPITOR 10 MG Take 1 Tab by mouth every bedtime.        Beclomethasone Dipropionate (Aero Soln) QVAR 40 MCG/ACT Inhale 1 Puff by mouth 2 Times a Day.        CycloSPORINE (Emulsion) RESTASIS 0.05 % Place 1 Drop in both eyes 2 times a day.        Divalproex Sodium (TABLET SR 24 HR) DEPAKOTE  MG Take 1 Tab by mouth 2 Times a Day.        Furosemide (Tab) LASIX 20 MG Take 1 Tab by mouth every day.        Gabapentin (Tab) NEURONTIN 600 MG Take 1 Tab by mouth 2 times a day.        Lisinopril (Tab) PRINIVIL 10 MG Take 1 Tab by mouth every day.        Multiple Vitamins-Minerals   Take  by mouth every day.        Propranolol HCl (CAPSULE SR 24 HR) INDERAL  MG Take 1 Cap by mouth every day.        Warfarin Sodium (Tab) COUMADIN 4 MG Take one tablet daily as directed by coumadin clinic        .                 Medicines prescribed today were sent to:     [facility] - [address] [address]    Phone: [phone] Fax: [phone]    Open 24 Hours?: No    [id number] - [place] - [address]    [address] [facility] [place] [address]    Phone: [phone] Fax: [phone]    Open 24 Hours?: No      Medication refill instructions:       If your prescription bottle indicates you have medication refills left, it is not necessary to call your provider’s office. Please contact your pharmacy and they will refill your medication.    If your prescription bottle indicates you do not have  any refills left, you may request refills at any time through one of the following ways: The online [employer] system (except Urgent Care), by calling your provider’s office, or by asking your pharmacy to contact your provider’s office with a refill request. Medication refills are processed only during regular business hours and may not be available until the next business day. Your provider may request additional information or to have a follow-up visit with you prior to refilling your medication.   *Please Note: Medication refills are assigned a new Rx number when refilled electronically. Your pharmacy may indicate that no refills were authorized even though a new prescription for the same medication is available at the pharmacy. Please request the medicine by name with the pharmacy before contacting your provider for a refill.        Other Notes About Your Plan     Enrolled in [facility]        Warfarin Dosing Calendar   May 2017 Details    Sun Mon Tue Wed Thu Fri Sat      1               2               3               4               5               6                 7               8               9               10               11               12               13                 14               15   2.0   5 mg   See details      16      5 mg         17      5 mg         18      5 mg         19      5 mg         20      5 mg           21      5 mg         22      5 mg         23      5 mg         24      5 mg         25      5 mg         26      5 mg         27                 28               29               30               31                   Date Details   05/15 This INR check   INR: 2.0       Date of next INR:  5/26/2017         How to take your warfarin dose     To take:  5 mg Take 1 of the 5 mg tablets.              [employer] Access Code: Activation code not generated  Current [employer] Status: Active

## 2017-05-15 NOTE — PROGRESS NOTES
Subjective:   Aria Solis is a 76 y.o. female who presents today in follow-up for atrial fibrillation with mild cardiomyopathy. She is also on anticoagulants.  Clinically she feels well.  She saw a neurologist for her tremor. We agreed to stop the metoprolol and troponins Inderal  mg per day both for rate control and tremor.  She does not think her tremors any better.  She's had no pedal edema.  She is taking warfarin.  She and her  are going on a four-month road trip to [place]  No other new issues.    Past Medical History   Diagnosis Date   • Postmenopausal    • Migraine without aura, with intractable migraine, so stated, without mention of status migrainosus    • Insomnia    • Hyperlipemia    • Osteoarthritis of more than one site    • Atrial fibrillation (CMS-HCC) February 2014     New onset atrial fibrillation.   • CAD (coronary artery disease) February 2014     ACS. Coronary angiogram with normal LVEF, proximal LAD 40-50% stenosis, distal LAD 40% stenosis.   • Cerebellar stroke, acute (CMS-[facility]) February 2014     CT scan with acute cerebellar stroke, managed by neurology.   • Chronic anticoagulation    • Back pain    • H/O total knee replacement 1996     Bilateral   • Stroke (CMS-[facility])    • Cholesterol blood decreased    • Medical home    • Sleep apnea      On CPAP     Past Surgical History   Procedure Laterality Date   • Knee replacement, total  Feb/April 1996     Bilateral   • Other orthopedic surgery  February 2012     Spinal stimulator   • Other orthopedic surgery  2004     Right ankle surgery   • Lumbar laminectomy diskectomy  1975/1984     L5 and L6 laminectomy   • Neuro dest facet l/s w/ig sngl  11/18/2014     [facility]   • Neuro dest facet l/s w/ig addl  11/18/2014     [facility]   • Neuro dest facet l/s w/ig addl  11/18/2014     [facility] by [facility]     Family  History   Problem Relation Age of Onset   • Other Father      trauma   • Heart Disease Neg Hx    • Heart Attack Neg Hx    • Heart Failure Neg Hx    • Hyperlipidemia Neg Hx      History   Smoking status   • Former Smoker -- 3.00 packs/day for 10 years   • Types: Cigarettes   • Quit date: 09/22/1968   Smokeless tobacco   • Never Used     Allergies   Allergen Reactions   • Darvon [Propoxyphene Hcl]    • Gluten Meal      Outpatient Encounter Prescriptions as of 5/15/2017   Medication Sig Dispense Refill   • warfarin (COUMADIN) 4 MG Tab Take one tablet daily as directed by coumadin clinic 120 Tab 3   • propranolol CR (INDERAL LA) 120 MG CAPSULE SR 24 HR Take 1 Cap by mouth every day. 120 Cap 3   • atorvastatin (LIPITOR) 10 MG Tab Take 1 Tab by mouth every bedtime. 120 Tab 3   • lisinopril (PRINIVIL) 10 MG Tab Take 1 Tab by mouth every day. 120 Tab 3   • cyclosporin (RESTASIS) 0.05 % ophthalmic emulsion Place 1 Drop in both eyes 2 times a day. 3 Each 3   • divalproex ER (DEPAKOTE ER) 500 MG TABLET SR 24 HR Take 1 Tab by mouth 2 Times a Day. 180 Tab 3   • gabapentin (NEURONTIN) 600 MG tablet Take 1 Tab by mouth 2 times a day. 180 Tab 3   • furosemide (LASIX) 20 MG Tab Take 1 Tab by mouth every day. 90 Tab 3   • aspirin EC 81 MG EC tablet Take 1 Tab by mouth every day. 30 Tab 1   • beclomethasone (QVAR) 40 MCG/ACT inhaler Inhale 1 Puff by mouth 2 Times a Day. (Patient taking differently: Inhale 1 Puff by mouth as needed.) 40 Inhaler 3   • Multiple Vitamins-Minerals (OCUVITE ADULT 50+ PO) Take  by mouth every day.     • [DISCONTINUED] propranolol CR (INDERAL LA) 120 MG CAPSULE SR 24 HR Take 1 Cap by mouth every day. 30 Cap 11   • [DISCONTINUED] warfarin (COUMADIN) 4 MG Tab Take one tablet daily as directed by coumadin clinic 90 Tab 1   • [DISCONTINUED] atorvastatin (LIPITOR) 10 MG Tab Take 1 Tab by mouth every bedtime. 90 Tab 3     No facility-administered encounter medications on file as of 5/15/2017.     Review of Systems  "  Constitutional: Negative for fever, chills and malaise/fatigue.   Eyes: Positive for blurred vision. Negative for discharge.   Respiratory: Negative for cough and shortness of breath.    Cardiovascular: Positive for palpitations. Negative for chest pain, leg swelling and PND.   Gastrointestinal: Negative for heartburn and nausea.   Genitourinary: Negative for dysuria and urgency.   Musculoskeletal: Positive for back pain. Negative for myalgias.   Skin: Negative for itching and rash.   Neurological: Positive for tremors. Negative for dizziness and headaches.   Endo/Heme/Allergies: Negative for environmental allergies. Does not bruise/bleed easily.   Psychiatric/Behavioral: Negative for depression. The patient is not nervous/anxious.         Objective:   BP 98/68 mmHg  Pulse 76  Ht 1.689 m (5' 6.5\")  Wt 89.812 kg (198 lb)  BMI 31.48 kg/m2  SpO2 89%    Physical Exam   Constitutional: She is oriented to person, place, and time. She appears well-developed and well-nourished.   HENT:   Head: Normocephalic.   Mouth/Throat: Oropharynx is clear and moist.   Eyes: Conjunctivae are normal. No scleral icterus.   Neck: No JVD present. No thyromegaly present.   Cardiovascular: Normal rate.  An irregularly irregular rhythm present.   Heart rate 75 at rest   Pulmonary/Chest: She has no wheezes. She has no rales (right lower lung field). She exhibits no tenderness.   Musculoskeletal: She exhibits no edema or tenderness.   Neurological: She is alert and oriented to person, place, and time.   Skin: Skin is warm and dry. She is not diaphoretic.   Psychiatric: She has a normal mood and affect. Thought content normal.       Assessment:     1. Chronic atrial fibrillation (CMS-HCC)  warfarin (COUMADIN) 4 MG Tab    lisinopril (PRINIVIL) 10 MG Tab    Echocardiogram Comp w/o Cont   2. History of embolic stroke     3. Tremor  propranolol CR (INDERAL LA) 120 MG CAPSULE SR 24 HR   4. Mixed hyperlipidemia  atorvastatin (LIPITOR) 10 MG Tab "       Medical Decision Making:  Today's Assessment / Status / Plan:   There is some incompleteness in her medication list.  She may or may not be taking lisinopril 10 mg per day.  I have refilled all of her medications for their long road trip.  I ordered echocardiogram for October and will see her thereafter

## 2017-05-23 ENCOUNTER — TELEPHONE (OUTPATIENT)
Dept: NEUROLOGY | Facility: MEDICAL CENTER | Age: 77
End: 2017-05-23

## 2017-05-23 DIAGNOSIS — M54.10 RADICULAR PAIN: ICD-10-CM

## 2017-05-23 DIAGNOSIS — G43.019 INTRACTABLE MIGRAINE WITHOUT AURA AND WITHOUT STATUS MIGRAINOSUS: ICD-10-CM

## 2017-05-23 RX ORDER — GABAPENTIN 600 MG/1
600 TABLET ORAL 2 TIMES DAILY
Qty: 240 TAB | Refills: 2 | Status: SHIPPED | OUTPATIENT
Start: 2017-05-23 | End: 2017-10-18 | Stop reason: SDUPTHER

## 2017-05-23 RX ORDER — DIVALPROEX SODIUM 500 MG/1
500 TABLET, EXTENDED RELEASE ORAL 2 TIMES DAILY
Qty: 240 TAB | Refills: 2 | Status: SHIPPED | OUTPATIENT
Start: 2017-05-23 | End: 2017-10-18 | Stop reason: SDUPTHER

## 2017-05-23 NOTE — TELEPHONE ENCOUNTER
Pt is requesting RX Depakote and gabapentin to be refilled x 4 months.Pt states that she will be travelling, leaving 5/29/2017. - CR

## 2017-05-26 ENCOUNTER — ANTICOAGULATION VISIT (OUTPATIENT)
Dept: VASCULAR LAB | Facility: MEDICAL CENTER | Age: 77
End: 2017-05-26
Attending: INTERNAL MEDICINE
Payer: MEDICARE

## 2017-05-26 DIAGNOSIS — Z86.73 HISTORY OF EMBOLIC STROKE: ICD-10-CM

## 2017-05-26 LAB — INR PPP: 1.7 (ref 2–3.5)

## 2017-05-26 PROCEDURE — 99212 OFFICE O/P EST SF 10 MIN: CPT

## 2017-05-26 PROCEDURE — 85610 PROTHROMBIN TIME: CPT

## 2017-05-26 RX ORDER — WARFARIN SODIUM 5 MG/1
5 TABLET ORAL DAILY
Qty: 90 TAB | Refills: 1 | Status: SHIPPED | OUTPATIENT
Start: 2017-05-26 | End: 2017-10-18 | Stop reason: SDUPTHER

## 2017-05-26 NOTE — MR AVS SNAPSHOT
Aria Ponceespie   2017 1:30 PM   Anticoagulation Visit   MRN: [id number]    Department:  Vascular Medicine   Dept Phone:  [phone]    Description:  Female : 1940   Provider:  [facility] EXAM 4           Allergies as of 2017     Allergen Noted Reactions    Darvon [Propoxyphene Hcl] 10/06/2010       Gluten Meal 01/10/2016         You were diagnosed with     History of embolic stroke   [326282]         Vital Signs     Smoking Status                   Former Smoker           Basic Information     Date Of Birth Sex Race Ethnicity Preferred Language    1940 Female White Non- English      Your appointments     Oct 09, 2017  1:30 PM   Established Patient with [facility] EXAM 4   [facility]  (--)    [address] [facility] [id number] [phone]            Oct 18, 2017 10:15 AM   ECHO with ECHO [facility], [facility] EXAM 12   ECHOCARDIOLOGY [facility] ([facility])    [address] [facility] [address] [facility] [id number] [phone]           No prep              Problem List              ICD-10-CM Priority Class Noted - Resolved    Insomnia G47.00 Low  Unknown - Present    Osteoarthritis of more than one site M15.9 Low  Unknown - Present    Neuropathy (CMS-[facility]) G62.9 Low  2011 - Present    Radicular pain- L1 nerve root impingement M54.10 Medium  2012 - Present    Atrial fibrillation (CMS-[facility]) I48.91 High  2014 - Present    Coronary atherosclerosis of native coronary artery I25.10 High  2014 - Present    Moderate mitral regurgitation I34.0 Medium  2014 - Present    Aortic valve sclerosis I35.8 High  2014 - Present    Intractable migraine without aura G43.019   Unknown - Present    History of embolic stroke Z86.79 Medium  2014 - Present    Mixed hyperlipidemia E78.2 High  2014 - Present    Chronic anticoagulation Z79.01 High  2014 - Present    Lumbosacral spondylosis without myelopathy M47.817   2014 - Present    Localized  adiposity of abdomen E65   7/5/2015 - Present    ANDRE on CPAP G47.33, Z99.89 Medium  8/27/2015 - Present    CKD (chronic kidney disease) stage 3, GFR 30-59 ml/min (Chronic) N18.3 Medium  10/12/2015 - Present    Subclinical hypothyroidism E03.9 Low  10/12/2015 - Present    Chronically dry eyes H04.129   10/12/2015 - Present    Macular degeneration of both eyes H35.30   10/12/2015 - Present    Medical home Z78.9   Unknown - Present    Edema R60.9 Medium  4/27/2016 - Present    Chronic systolic congestive heart failure (CMS-[facility]) I50.22 Medium  5/2/2016 - Present    Obesity (BMI 30-39.9) E66.9   5/2/2016 - Present    Nausea R11.0   10/6/2016 - Present    Tremor R25.1   10/6/2016 - Present      Health Maintenance        Date Due Completion Dates    IMM DTaP/Tdap/Td Vaccine (1 - Tdap) 7/4/1959 ---    IMM ZOSTER VACCINE 7/4/2000 ---    BONE DENSITY 7/4/2005 ---    MAMMOGRAM 11/22/2017 11/22/2016, 12/12/2013    COLONOSCOPY 4/10/2018 4/10/2015            Results     POCT Protime      Component    INR    1.7                        Current Immunizations     13-VALENT PCV PREVNAR 1/21/2016  8:39 AM    Influenza TIV (IM) 9/1/2013, 10/29/2011    Influenza Vaccine Adult HD 10/6/2016, 9/28/2015  3:34 PM, 9/27/2014    Pneumococcal polysaccharide vaccine (PPSV-23) 9/1/2013, 11/29/2011      Below and/or attached are the medications your provider expects you to take. Review all of your home medications and newly ordered medications with your provider and/or pharmacist. Follow medication instructions as directed by your provider and/or pharmacist. Please keep your medication list with you and share with your provider. Update the information when medications are discontinued, doses are changed, or new medications (including over-the-counter products) are added; and carry medication information at all times in the event of emergency situations     Allergies:  DARVON - (reactions not documented)     GLUTEN MEAL - (reactions not documented)                Medications  Valid as of: May 26, 2017 -  1:37 PM    Generic Name Brand Name Tablet Size Instructions for use    Aspirin (Tablet Delayed Response) aspirin 81 MG Take 1 Tab by mouth every day.        Atorvastatin Calcium (Tab) LIPITOR 10 MG Take 1 Tab by mouth every bedtime.        Beclomethasone Dipropionate (Aero Soln) QVAR 40 MCG/ACT Inhale 1 Puff by mouth 2 Times a Day.        CycloSPORINE (Emulsion) RESTASIS 0.05 % Place 1 Drop in both eyes 2 times a day.        Divalproex Sodium (TABLET SR 24 HR) DEPAKOTE  MG Take 1 Tab by mouth 2 Times a Day.        Furosemide (Tab) LASIX 20 MG Take 1 Tab by mouth every day.        Gabapentin (Tab) NEURONTIN 600 MG Take 1 Tab by mouth 2 times a day.        Lisinopril (Tab) PRINIVIL 10 MG Take 1 Tab by mouth every day.        Multiple Vitamins-Minerals   Take  by mouth every day.        Propranolol HCl (CAPSULE SR 24 HR) INDERAL  MG Take 1 Cap by mouth every day.        Warfarin Sodium (Tab) COUMADIN 5 MG Take 1 Tab by mouth every day for 30 days.        .                 Medicines prescribed today were sent to:     [facility] [address] [address]    Phone: [phone] Fax: [phone]    Open 24 Hours?: No    [facility] #[id number] - [place] - [address]    [address] [facility] [address] [place] [address]    Phone: [phone] Fax: [phone]    Open 24 Hours?: No      Medication refill instructions:       If your prescription bottle indicates you have medication refills left, it is not necessary to call your provider’s office. Please contact your pharmacy and they will refill your medication.    If your prescription bottle indicates you do not have any refills left, you may request refills at any time through one of the following ways: The online [employer] system (except Urgent Care), by calling your provider’s office, or by asking your pharmacy to contact your provider’s  office with a refill request. Medication refills are processed only during regular business hours and may not be available until the next business day. Your provider may request additional information or to have a follow-up visit with you prior to refilling your medication.   *Please Note: Medication refills are assigned a new Rx number when refilled electronically. Your pharmacy may indicate that no refills were authorized even though a new prescription for the same medication is available at the pharmacy. Please request the medicine by name with the pharmacy before contacting your provider for a refill.        Other Notes About Your Plan     Enrolled in Medical [facility]        Warfarin Dosing Calendar   May 2017 Details    Sun Mon Tue Wed Thu Fri Sat      1               2               3               4               5               6                 7               8               9               10               11               12               13                 14               15               16               17               18               19               20                 21               22               23               24               25               26   1.7   7.5 mg   See details      27      5 mg           28      5 mg         29      5 mg         30      5 mg         31      5 mg             Date Details   05/26 This INR check   INR: 1.7               How to take your warfarin dose     To take:  5 mg Take 1 of the 5 mg tablets.    To take:  7.5 mg Take 1.5 of the 5 mg tablets.           Warfarin Dosing Calendar   June 2017 Details    Sun Mon Tue Wed Thu Fri Sat         1      5 mg         2      5 mg         3      5 mg           4      5 mg         5      5 mg         6      5 mg         7      5 mg         8      5 mg         9      5 mg         10      5 mg           11      5 mg         12      5 mg         13      5 mg         14      5 mg         15      5 mg         16       5 mg         17      5 mg           18      5 mg         19      5 mg         20      5 mg         21      5 mg         22      5 mg         23      5 mg         24      5 mg           25      5 mg         26      5 mg         27      5 mg         28      5 mg         29      5 mg         30      5 mg           Date Details   No additional details            How to take your warfarin dose     To take:  5 mg Take 1 of the 5 mg tablets.           Warfarin Dosing Calendar   July 2017 Details    Sun Mon Tue Wed Thu Fri Sat           1      5 mg           2      5 mg         3      5 mg         4      5 mg         5      5 mg         6      5 mg         7      5 mg         8      5 mg           9      5 mg         10      5 mg         11      5 mg         12      5 mg         13      5 mg         14      5 mg         15      5 mg           16      5 mg         17      5 mg         18      5 mg         19      5 mg         20      5 mg         21      5 mg         22      5 mg           23      5 mg         24      5 mg         25      5 mg         26      5 mg         27      5 mg         28      5 mg         29      5 mg           30      5 mg         31      5 mg               Date Details   No additional details            How to take your warfarin dose     To take:  5 mg Take 1 of the 5 mg tablets.           Warfarin Dosing Calendar   August 2017 Details    Sun Mon Tue Wed Thu Fri Sat       1      5 mg         2      5 mg         3      5 mg         4      5 mg         5      5 mg           6      5 mg         7      5 mg         8      5 mg         9      5 mg         10      5 mg         11      5 mg         12      5 mg           13      5 mg         14      5 mg         15      5 mg         16      5 mg         17      5 mg         18      5 mg         19      5 mg           20      5 mg         21      5 mg         22      5 mg         23      5 mg         24      5 mg         25      5 mg         26      5 mg            27      5 mg         28      5 mg         29      5 mg         30      5 mg         31      5 mg            Date Details   No additional details            How to take your warfarin dose     To take:  5 mg Take 1 of the 5 mg tablets.           Warfarin Dosing Calendar   September 2017 Details    Sun Mon Tue Wed Thu Fri Sat          1      5 mg         2      5 mg           3      5 mg         4      5 mg         5      5 mg         6      5 mg         7      5 mg         8      5 mg         9      5 mg           10      5 mg         11      5 mg         12      5 mg         13      5 mg         14      5 mg         15      5 mg         16      5 mg           17      5 mg         18      5 mg         19      5 mg         20      5 mg         21      5 mg         22      5 mg         23      5 mg           24      5 mg         25      5 mg         26      5 mg         27      5 mg         28      5 mg         29      5 mg         30      5 mg          Date Details   No additional details            How to take your warfarin dose     To take:  5 mg Take 1 of the 5 mg tablets.           Warfarin Dosing Calendar   October 2017 Details    Sun Mon Tue Wed Thu Fri Sat     1      5 mg         2      5 mg         3      5 mg         4      5 mg         5      5 mg         6      5 mg         7      5 mg           8      5 mg         9      5 mg         10               11               12               13               14                 15               16               17               18               19               20               21                 22               23               24               25               26               27               28                 29               30               31                    Date Details   No additional details    Date of next INR:  10/9/2017         How to take your warfarin dose     To take:  5 mg Take 1 of the 5 mg tablets.              Market Track Access Code:  Activation code not generated  Current MyChart Status: Active

## 2017-05-26 NOTE — PROGRESS NOTES
Anticoagulation Summary as of 5/26/2017     INR goal 2.0-3.0   Selected INR 1.7! (5/26/2017)   Maintenance plan 5 mg (5 mg x 1) every day   Weekly total 35 mg   Plan last modified TORI Jackson (4/24/2017)   Next INR check 10/9/2017   Target end date Indefinite    Indications   History of embolic stroke [Z86.79]  Atrial fibrillation (CMS-HCC) [I48.91]         Anticoagulation Episode Summary     INR check location [facility]    Preferred lab     Send INR reminders to     Comments       Anticoagulation Care Providers     Provider Role Specialty Phone number    Anuj Denise M.D. Referring Cardiology [phone]    [facility]   [phone]        Anticoagulation Patient Findings   Negatives Missed Doses, Extra Doses, Medication Changes, Antibiotic Use, Diet Changes, Dental/Other Procedures, Hospitalization, Bleeding Gums, Nose Bleeds, Blood in Urine, Blood in Stool, Any Bruising, Other Complaints        Saw patient in clinic with subtherapeutic INR of 1.7.  Patient states her diet has been very consistent and has not had any changes.  She denies any missed doses.  Based on her past regimens and INR trends, she has been therapeutic at 5 mg daily and went supratherapeutic at 7.5 mg once weekly with 5 mg all other days.  I do not feel that she should be taking a higher weekly regimen - will have her bolus tonight with 7.5 mg and then return to 5 mg daily.  She will be going to [place] until 10/2017, which she has an appointment scheduled at our clinic on 10/9/17.  We discussed that it is important to have her INR checked while she is out of town, especially since it was subtherapeutic today.  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  We refilled her warfarin prescription, which was e-prescribed to [facility] pharmacy.  She was also given a print out of a standing lab order for  INR draw to help facilitate.  Discussed that they should call the clinic with any concerns while they are away and with any INR readings they obtain.    Follow up on 10/9/17 at 1:30 pm per pt request.    Shelby Zaragoza, PharmD.  PGY-1 Resident  [phone]

## 2017-05-30 LAB — INR BLD: 1.7 (ref 0.9–1.2)

## 2017-07-10 ENCOUNTER — PATIENT OUTREACH (OUTPATIENT)
Dept: HEALTH INFORMATION MANAGEMENT | Facility: OTHER | Age: 77
End: 2017-07-10

## 2017-10-03 ENCOUNTER — NON-PROVIDER VISIT (OUTPATIENT)
Dept: MEDICAL GROUP | Facility: MEDICAL CENTER | Age: 77
End: 2017-10-03
Payer: MEDICARE

## 2017-10-03 DIAGNOSIS — Z23 NEEDS FLU SHOT: ICD-10-CM

## 2017-10-03 PROCEDURE — G0008 ADMIN INFLUENZA VIRUS VAC: HCPCS | Performed by: INTERNAL MEDICINE

## 2017-10-03 PROCEDURE — 90662 IIV NO PRSV INCREASED AG IM: CPT | Performed by: INTERNAL MEDICINE

## 2017-10-03 NOTE — PROGRESS NOTES
"Aria Solis is a 77 y.o. female here for a non-provider visit for:   FLU    Reason for immunization: Annual Flu Vaccine  Immunization records indicate need for vaccine: Yes, confirmed with Epic  Minimum interval has been met for this vaccine: Yes  ABN completed: Not Indicated    Order and dose verified by:  [facility]  VIS Dated  8/7/15 was given to patient: Yes  All IAC Questionnaire questions were answered \"No.\"    Patient tolerated injection and no adverse effects were observed or reported: Yes    Pt scheduled for next dose in series: Not indicated  "

## 2017-10-09 ENCOUNTER — ANTICOAGULATION VISIT (OUTPATIENT)
Dept: VASCULAR LAB | Facility: MEDICAL CENTER | Age: 77
End: 2017-10-09
Attending: INTERNAL MEDICINE
Payer: MEDICARE

## 2017-10-09 VITALS — DIASTOLIC BLOOD PRESSURE: 70 MMHG | SYSTOLIC BLOOD PRESSURE: 126 MMHG

## 2017-10-09 DIAGNOSIS — I48.91 ATRIAL FIBRILLATION, UNSPECIFIED TYPE (HCC): ICD-10-CM

## 2017-10-09 DIAGNOSIS — Z86.73 HISTORY OF EMBOLIC STROKE: ICD-10-CM

## 2017-10-09 LAB
INR BLD: 4.6 (ref 0.9–1.2)
INR PPP: 4.6 (ref 2–3.5)

## 2017-10-09 PROCEDURE — 85610 PROTHROMBIN TIME: CPT

## 2017-10-09 PROCEDURE — 99212 OFFICE O/P EST SF 10 MIN: CPT | Performed by: NURSE PRACTITIONER

## 2017-10-09 NOTE — PROGRESS NOTES
Anticoagulation Summary  As of 10/9/2017    INR goal:   2.0-3.0   TTR:   38.8 % (1.7 y)   Today's INR:   4.6!   Maintenance plan:   5 mg (5 mg x 1) every day   Weekly total:   35 mg   Plan last modified:   TORI Jackson (4/24/2017)   Next INR check:   10/23/2017   Target end date:   Indefinite    Indications    History of embolic stroke [Z86.79]  Atrial fibrillation (CMS-HCC) [I48.91]             Anticoagulation Episode Summary     [facility] check [facility]    Preferred lab:       Send INR reminders to:       Comments:         Anticoagulation Care Providers     Provider Role Specialty Phone number    Aunj Denise M.D. Referring Cardiology [phone]    [facility]   [phone]        Anticoagulation Patient Findings      HPI:  Aria Solis seen in clinic today for follow up on anticoagulation therapy in the presence of AF/CVA hx. She hasn't had her INR checked since May. She just returned from a extended trip to [place]. Denies any changes to current medical/health status since last appointment. Denies any medication or diet changes. No current symptoms of bleeding or thrombosis reported. Confirmed her current dose.    A/P:   INR supratherapeutic. HOLD one dose then continue current regimen. BP recorded in vitals. Emphasized importance of regular follow up with INRs.    Follow up appointment in 2 week(s).    Next Appointment: Monday, October 23, 2017 at 1:45 pm.    Arabella CHANEY

## 2017-10-18 ENCOUNTER — OFFICE VISIT (OUTPATIENT)
Dept: MEDICAL GROUP | Facility: MEDICAL CENTER | Age: 77
End: 2017-10-18
Payer: MEDICARE

## 2017-10-18 VITALS
DIASTOLIC BLOOD PRESSURE: 82 MMHG | OXYGEN SATURATION: 93 % | HEIGHT: 67 IN | SYSTOLIC BLOOD PRESSURE: 110 MMHG | WEIGHT: 193 LBS | TEMPERATURE: 97.6 F | BODY MASS INDEX: 30.29 KG/M2 | RESPIRATION RATE: 18 BRPM | HEART RATE: 88 BPM

## 2017-10-18 DIAGNOSIS — R25.1 TREMOR: ICD-10-CM

## 2017-10-18 DIAGNOSIS — E66.9 OBESITY (BMI 30-39.9): ICD-10-CM

## 2017-10-18 DIAGNOSIS — G43.019 INTRACTABLE MIGRAINE WITHOUT AURA AND WITHOUT STATUS MIGRAINOSUS: ICD-10-CM

## 2017-10-18 DIAGNOSIS — E78.2 MIXED HYPERLIPIDEMIA: ICD-10-CM

## 2017-10-18 DIAGNOSIS — L98.9 FOOT LESION: ICD-10-CM

## 2017-10-18 DIAGNOSIS — F51.01 PRIMARY INSOMNIA: ICD-10-CM

## 2017-10-18 DIAGNOSIS — R19.7 DIARRHEA, UNSPECIFIED TYPE: ICD-10-CM

## 2017-10-18 DIAGNOSIS — I48.20 CHRONIC ATRIAL FIBRILLATION (HCC): ICD-10-CM

## 2017-10-18 DIAGNOSIS — E03.8 SUBCLINICAL HYPOTHYROIDISM: ICD-10-CM

## 2017-10-18 DIAGNOSIS — Z86.73 HISTORY OF EMBOLIC STROKE: ICD-10-CM

## 2017-10-18 DIAGNOSIS — M54.10 RADICULAR PAIN: ICD-10-CM

## 2017-10-18 DIAGNOSIS — I50.22 CHRONIC SYSTOLIC CONGESTIVE HEART FAILURE (HCC): ICD-10-CM

## 2017-10-18 DIAGNOSIS — G62.9 NEUROPATHY: ICD-10-CM

## 2017-10-18 DIAGNOSIS — N18.30 CKD (CHRONIC KIDNEY DISEASE) STAGE 3, GFR 30-59 ML/MIN (HCC): Chronic | ICD-10-CM

## 2017-10-18 PROCEDURE — 99214 OFFICE O/P EST MOD 30 MIN: CPT | Performed by: FAMILY MEDICINE

## 2017-10-18 RX ORDER — TIZANIDINE 4 MG/1
4 TABLET ORAL 2 TIMES DAILY PRN
Qty: 180 TAB | Refills: 1 | Status: SHIPPED | OUTPATIENT
Start: 2017-10-18 | End: 2018-03-05

## 2017-10-18 RX ORDER — LISINOPRIL 10 MG/1
10 TABLET ORAL DAILY
Qty: 90 TAB | Refills: 3 | Status: SHIPPED | OUTPATIENT
Start: 2017-10-18 | End: 2018-03-05

## 2017-10-18 RX ORDER — GABAPENTIN 600 MG/1
600 TABLET ORAL 2 TIMES DAILY
Qty: 180 TAB | Refills: 3 | Status: SHIPPED | OUTPATIENT
Start: 2017-10-18 | End: 2020-01-27 | Stop reason: SDUPTHER

## 2017-10-18 RX ORDER — PROPRANOLOL HYDROCHLORIDE 120 MG/1
120 CAPSULE, EXTENDED RELEASE ORAL DAILY
Qty: 90 CAP | Refills: 3 | Status: SHIPPED | OUTPATIENT
Start: 2017-10-18 | End: 2018-06-26

## 2017-10-18 RX ORDER — ATORVASTATIN CALCIUM 10 MG/1
10 TABLET, FILM COATED ORAL
Qty: 90 TAB | Refills: 3 | Status: SHIPPED | OUTPATIENT
Start: 2017-10-18 | End: 2018-11-30

## 2017-10-18 RX ORDER — ZOLPIDEM TARTRATE 5 MG/1
5 TABLET ORAL NIGHTLY PRN
Qty: 90 TAB | Refills: 1 | Status: SHIPPED
Start: 2017-10-18 | End: 2018-06-26 | Stop reason: SDUPTHER

## 2017-10-18 RX ORDER — WARFARIN SODIUM 5 MG/1
5 TABLET ORAL DAILY
Status: SHIPPED | DISCHARGE
Start: 2017-10-18 | End: 2017-12-13 | Stop reason: SDUPTHER

## 2017-10-18 RX ORDER — DIVALPROEX SODIUM 500 MG/1
500 TABLET, EXTENDED RELEASE ORAL 2 TIMES DAILY
Qty: 180 TAB | Refills: 3 | Status: SHIPPED | OUTPATIENT
Start: 2017-10-18 | End: 2018-10-14 | Stop reason: SDUPTHER

## 2017-10-18 ASSESSMENT — PATIENT HEALTH QUESTIONNAIRE - PHQ9: CLINICAL INTERPRETATION OF PHQ2 SCORE: 0

## 2017-10-18 NOTE — ASSESSMENT & PLAN NOTE
Patient has chronic kidney disease stage III, her last blood test was approximately 16 months ago which showed a stable GFR around 52.

## 2017-10-18 NOTE — ASSESSMENT & PLAN NOTE
Patient is being followed by [facility]madin clinic and is currently on Coumadin 5 mg daily. She has been out of her propanolol for the last several weeks because she was on a 4 month RV trip to [place].

## 2017-10-18 NOTE — ASSESSMENT & PLAN NOTE
She has been out of gabapentin 600 mg twice a day. She is complaining of increased neuropathy. Patient is requesting a refill.

## 2017-10-18 NOTE — ASSESSMENT & PLAN NOTE
Patient is being followed by neurology for increased tremors. She is currently on gabapentin, propranolol, Depakote.

## 2017-10-18 NOTE — ASSESSMENT & PLAN NOTE
Patient is currently on Depakote extended release 500 mg twice daily which is effective at controlling her migraines.

## 2017-10-18 NOTE — ASSESSMENT & PLAN NOTE
Patient is tolerating Lasix 20 mg daily, she did not run out of this medication. She did run out of lisinopril 10 mg and propanolol 120 mg. She also ran out of Lipitor 10 mg daily.

## 2017-10-18 NOTE — PROGRESS NOTES
Subjective:   Aria Solis is a 77 y.o. female here today for A. fib    Atrial fibrillation  Patient is being followed by Coumadin clinic and is currently on Coumadin 5 mg daily. She has been out of her propanolol for the last several weeks because she was on a 4 month RV trip to [place].    Chronic systolic congestive heart failure  Patient is tolerating Lasix 20 mg daily, she did not run out of this medication. She did run out of lisinopril 10 mg and propanolol 120 mg. She also ran out of Lipitor 10 mg daily.    CKD (chronic kidney disease) stage 3, GFR 30-59 ml/min  Patient has chronic kidney disease stage III, her last blood test was approximately 16 months ago which showed a stable GFR around 52.    History of embolic stroke  Patient has history of embolic stroke secondary to A. fib. She is currently on Coumadin.    Intractable migraine without aura  Patient is currently on Depakote extended release 500 mg twice daily which is effective at controlling her migraines.    Mixed hyperlipidemia  Patient has been off atorvastatin 10 mg daily.    Neuropathy  She has been out of gabapentin 600 mg twice a day. She is complaining of increased neuropathy. Patient is requesting a refill.    Obesity (BMI 30-39.9)  She is trying to eat smaller portions and as a result has loss 5-10 pounds in the last one year.    Insomnia  Patient seldomly uses Ambien 5 mg, but has run out and is requesting a refill.    Radicular pain- L1 nerve root impingement  Patient has history of what she thinks are muscle spasms in her lower back. They're relieved with laying down. Patient has been prescribed oxycodone and hydrocodone in the past and is requesting a refill.    Tremor  Patient is being followed by neurology for increased tremors. She is currently on gabapentin, propranolol, Depakote.         Current medicines (including changes today)  Current Outpatient Prescriptions   Medication Sig Dispense Refill   • propranolol CR  "(INDERAL LA) 120 MG CAPSULE SR 24 HR Take 1 Cap by mouth every day. 90 Cap 3   • lisinopril (PRINIVIL) 10 MG Tab Take 1 Tab by mouth every day. 90 Tab 3   • gabapentin (NEURONTIN) 600 MG tablet Take 1 Tab by mouth 2 times a day. 180 Tab 3   • divalproex ER (DEPAKOTE ER) 500 MG TABLET SR 24 HR Take 1 Tab by mouth 2 Times a Day. 180 Tab 3   • atorvastatin (LIPITOR) 10 MG Tab Take 1 Tab by mouth every bedtime. 90 Tab 3   • zolpidem (AMBIEN) 5 MG Tab Take 1 Tab by mouth at bedtime as needed for Sleep. 90 Tab 1   • tizanidine (ZANAFLEX) 4 MG Tab Take 1 Tab by mouth 2 times a day as needed (back pain). 180 Tab 1   • warfarin (COUMADIN) 5 MG Tab Take 1 Tab by mouth every day.     • cyclosporin (RESTASIS) 0.05 % ophthalmic emulsion Place 1 Drop in both eyes 2 times a day. 3 Each 3   • furosemide (LASIX) 20 MG Tab Take 1 Tab by mouth every day. 90 Tab 3   • aspirin EC 81 MG EC tablet Take 1 Tab by mouth every day. 30 Tab 1   • beclomethasone (QVAR) 40 MCG/ACT inhaler Inhale 1 Puff by mouth 2 Times a Day. (Patient taking differently: Inhale 1 Puff by mouth as needed.) 40 Inhaler 3   • Multiple Vitamins-Minerals (OCUVITE ADULT 50+ PO) Take  by mouth every day.       No current facility-administered medications for this visit.      She  has a past medical history of Atrial fibrillation (CMS-HCC) (February 2014); Back pain; CAD (coronary artery disease) (February 2014); Cerebellar stroke, acute (CMS-HCC) (February 2014); Cholesterol blood decreased; Chronic anticoagulation; H/O total knee replacement (1996); Hyperlipemia; Insomnia; Medical home; Migraine without aura, with intractable migraine, so stated, without mention of status migrainosus; Osteoarthritis of more than one site; Postmenopausal; Sleep apnea; and Stroke (CMS-HCC).    ROS   No chest pain, no shortness of breath       Objective:     Blood pressure 110/82, pulse 88, temperature 36.4 °C (97.6 °F), resp. rate 18, height 1.689 m (5' 6.5\"), weight 87.5 kg (193 lb), " SpO2 93 %. Body mass index is 30.68 kg/m².   Physical Exam:  Constitutional: Alert, no distress.  Skin: Warm, dry, good turgor, no rashes in visible areas.  Eye: Equal, round and reactive, conjunctiva clear, lids normal.  Respiratory: Unlabored respiratory effort, lungs clear to auscultation, no wheezes, no ronchi.  Cardiovascular: Irregular rhythm and rate  Psych: Alert and oriented x3, normal affect and mood.        Assessment and Plan:   The following treatment plan was discussed    1. Tremor  Stable. Continue propanolol.  - propranolol CR (INDERAL LA) 120 MG CAPSULE SR 24 HR; Take 1 Cap by mouth every day.  Dispense: 90 Cap; Refill: 3    2. Chronic atrial fibrillation (CMS-HCC)  Uncontrolled. Restart propanolol and lisinopril. Follow-up with cardiology. Continue Coumadin  - lisinopril (PRINIVIL) 10 MG Tab; Take 1 Tab by mouth every day.  Dispense: 90 Tab; Refill: 3  - COMP METABOLIC PANEL; Future  - CBC WITH DIFFERENTIAL; Future    3. Radicular pain- L1 nerve root impingement  Trial of Zanaflex. Discussed the risk with hydrocodone and oxycodone, we will defer on giving her refills of narcotics today. Continue gabapentin.  - gabapentin (NEURONTIN) 600 MG tablet; Take 1 Tab by mouth 2 times a day.  Dispense: 180 Tab; Refill: 3    4. Intractable migraine without aura and without status migrainosus  Controlled. Continue Depakote.  - divalproex ER (DEPAKOTE ER) 500 MG TABLET SR 24 HR; Take 1 Tab by mouth 2 Times a Day.  Dispense: 180 Tab; Refill: 3    5. Mixed hyperlipidemia  Restart atorvastatin. Check labs and call with results.  - atorvastatin (LIPITOR) 10 MG Tab; Take 1 Tab by mouth every bedtime.  Dispense: 90 Tab; Refill: 3  - COMP METABOLIC PANEL; Future  - LIPID PROFILE; Future    6. Primary insomnia  Controlled. Continue as needed Ambien.  - zolpidem (AMBIEN) 5 MG Tab; Take 1 Tab by mouth at bedtime as needed for Sleep.  Dispense: 90 Tab; Refill: 1    7. CKD (chronic kidney disease) stage 3, GFR 30-59  ml/min  Check labs and call with results.  - COMP METABOLIC PANEL; Future  - CBC WITH DIFFERENTIAL; Future    8. Subclinical hypothyroidism  Check labs and call with results.  - TSH WITH REFLEX TO FT4; Future    9. Diarrhea, unspecified type  Patient had an episode of diarrhea on her road trip. She states it may been related to too much gluten. She would like a celiac blood tests done.  - CELIAC DISEASE AB PANEL; Future    10. Obesity (BMI 30-39.9)  - Patient identified as having weight management issue.  Appropriate orders and counseling given.    11. Neuropathy (CMS-[facility])  Controlled. Continue gabapentin.    12. Chronic systolic congestive heart failure (CMS-[facility])  Stable. Continue Lasix, lisinopril and restart atorvastatin. Follow-up with cardiology.    13. History of embolic stroke  - warfarin (COUMADIN) 5 MG Tab; Take 1 Tab by mouth every day.    14. Foot lesion  On the way out patient asked medical assistant for a referral to podiatry for a lesion on the back of her foot. Podiatry referral done.  - REFERRAL TO PODIATRY      Followup: Return in about 1 year (around 10/18/2018) for Annual, Short.

## 2017-10-18 NOTE — ASSESSMENT & PLAN NOTE
Patient has history of what she thinks are muscle spasms in her lower back. They're relieved with laying down. Patient has been prescribed oxycodone and hydrocodone in the past and is requesting a refill.

## 2017-10-23 ENCOUNTER — HOSPITAL ENCOUNTER (OUTPATIENT)
Dept: LAB | Facility: MEDICAL CENTER | Age: 77
End: 2017-10-23
Attending: FAMILY MEDICINE
Payer: MEDICARE

## 2017-10-23 ENCOUNTER — ANTICOAGULATION VISIT (OUTPATIENT)
Dept: VASCULAR LAB | Facility: MEDICAL CENTER | Age: 77
End: 2017-10-23
Attending: INTERNAL MEDICINE
Payer: MEDICARE

## 2017-10-23 VITALS — SYSTOLIC BLOOD PRESSURE: 110 MMHG | HEART RATE: 129 BPM | DIASTOLIC BLOOD PRESSURE: 62 MMHG

## 2017-10-23 DIAGNOSIS — N18.30 CKD (CHRONIC KIDNEY DISEASE) STAGE 3, GFR 30-59 ML/MIN (HCC): Chronic | ICD-10-CM

## 2017-10-23 DIAGNOSIS — R19.7 DIARRHEA, UNSPECIFIED TYPE: ICD-10-CM

## 2017-10-23 DIAGNOSIS — E78.2 MIXED HYPERLIPIDEMIA: ICD-10-CM

## 2017-10-23 DIAGNOSIS — I48.20 CHRONIC ATRIAL FIBRILLATION (HCC): ICD-10-CM

## 2017-10-23 DIAGNOSIS — Z86.73 HISTORY OF EMBOLIC STROKE: ICD-10-CM

## 2017-10-23 DIAGNOSIS — E03.8 SUBCLINICAL HYPOTHYROIDISM: ICD-10-CM

## 2017-10-23 LAB
ALBUMIN SERPL BCP-MCNC: 3.9 G/DL (ref 3.2–4.9)
ALBUMIN/GLOB SERPL: 1.2 G/DL
ALP SERPL-CCNC: 55 U/L (ref 30–99)
ALT SERPL-CCNC: 5 U/L (ref 2–50)
ANION GAP SERPL CALC-SCNC: 8 MMOL/L (ref 0–11.9)
AST SERPL-CCNC: 13 U/L (ref 12–45)
BASOPHILS # BLD AUTO: 0.3 % (ref 0–1.8)
BASOPHILS # BLD: 0.02 K/UL (ref 0–0.12)
BILIRUB SERPL-MCNC: 0.5 MG/DL (ref 0.1–1.5)
BUN SERPL-MCNC: 16 MG/DL (ref 8–22)
CALCIUM SERPL-MCNC: 9.6 MG/DL (ref 8.5–10.5)
CHLORIDE SERPL-SCNC: 107 MMOL/L (ref 96–112)
CHOLEST SERPL-MCNC: 219 MG/DL (ref 100–199)
CO2 SERPL-SCNC: 25 MMOL/L (ref 20–33)
CREAT SERPL-MCNC: 0.99 MG/DL (ref 0.5–1.4)
EOSINOPHIL # BLD AUTO: 0.07 K/UL (ref 0–0.51)
EOSINOPHIL NFR BLD: 1.1 % (ref 0–6.9)
ERYTHROCYTE [DISTWIDTH] IN BLOOD BY AUTOMATED COUNT: 51.9 FL (ref 35.9–50)
GFR SERPL CREATININE-BSD FRML MDRD: 54 ML/MIN/1.73 M 2
GLOBULIN SER CALC-MCNC: 3.2 G/DL (ref 1.9–3.5)
GLUCOSE SERPL-MCNC: 89 MG/DL (ref 65–99)
HCT VFR BLD AUTO: 48.8 % (ref 37–47)
HDLC SERPL-MCNC: 35 MG/DL
HGB BLD-MCNC: 16.1 G/DL (ref 12–16)
IMM GRANULOCYTES # BLD AUTO: 0.02 K/UL (ref 0–0.11)
IMM GRANULOCYTES NFR BLD AUTO: 0.3 % (ref 0–0.9)
INR BLD: 4.4 (ref 0.9–1.2)
INR PPP: 4.4 (ref 2–3.5)
LDLC SERPL CALC-MCNC: 141 MG/DL
LYMPHOCYTES # BLD AUTO: 3.48 K/UL (ref 1–4.8)
LYMPHOCYTES NFR BLD: 54.5 % (ref 22–41)
MCH RBC QN AUTO: 35.5 PG (ref 27–33)
MCHC RBC AUTO-ENTMCNC: 33 G/DL (ref 33.6–35)
MCV RBC AUTO: 107.5 FL (ref 81.4–97.8)
MONOCYTES # BLD AUTO: 0.84 K/UL (ref 0–0.85)
MONOCYTES NFR BLD AUTO: 13.1 % (ref 0–13.4)
NEUTROPHILS # BLD AUTO: 1.96 K/UL (ref 2–7.15)
NEUTROPHILS NFR BLD: 30.7 % (ref 44–72)
NRBC # BLD AUTO: 0 K/UL
NRBC BLD AUTO-RTO: 0 /100 WBC
PLATELET # BLD AUTO: 171 K/UL (ref 164–446)
PMV BLD AUTO: 12.3 FL (ref 9–12.9)
POTASSIUM SERPL-SCNC: 4.3 MMOL/L (ref 3.6–5.5)
PROT SERPL-MCNC: 7.1 G/DL (ref 6–8.2)
RBC # BLD AUTO: 4.54 M/UL (ref 4.2–5.4)
SODIUM SERPL-SCNC: 140 MMOL/L (ref 135–145)
T4 FREE SERPL-MCNC: 0.73 NG/DL (ref 0.53–1.43)
TRIGL SERPL-MCNC: 214 MG/DL (ref 0–149)
TSH SERPL DL<=0.005 MIU/L-ACNC: 7.76 UIU/ML (ref 0.3–3.7)
WBC # BLD AUTO: 6.4 K/UL (ref 4.8–10.8)

## 2017-10-23 PROCEDURE — 85025 COMPLETE CBC W/AUTO DIFF WBC: CPT

## 2017-10-23 PROCEDURE — 85610 PROTHROMBIN TIME: CPT

## 2017-10-23 PROCEDURE — 36415 COLL VENOUS BLD VENIPUNCTURE: CPT

## 2017-10-23 PROCEDURE — 82784 ASSAY IGA/IGD/IGG/IGM EACH: CPT

## 2017-10-23 PROCEDURE — 84439 ASSAY OF FREE THYROXINE: CPT

## 2017-10-23 PROCEDURE — 83516 IMMUNOASSAY NONANTIBODY: CPT

## 2017-10-23 PROCEDURE — 80061 LIPID PANEL: CPT

## 2017-10-23 PROCEDURE — 80053 COMPREHEN METABOLIC PANEL: CPT

## 2017-10-23 PROCEDURE — 84443 ASSAY THYROID STIM HORMONE: CPT

## 2017-10-23 PROCEDURE — 99212 OFFICE O/P EST SF 10 MIN: CPT

## 2017-10-23 NOTE — PROGRESS NOTES
OP Anticoagulation Service Note    Date: 10/23/2017  Vitals:    10/23/17 1337   BP: 110/62   Pulse: (!) 129       Anticoagulation Summary  As of 10/23/2017    INR goal:   2.0-3.0   TTR:   37.9 % (1.7 y)   Today's INR:   4.4!   Maintenance plan:   2.5 mg (5 mg x 0.5) on Mon, Wed, Fri; 5 mg (5 mg x 1) all other days   Weekly total:   27.5 mg   Plan last modified:   Scooby Pizarro, PharmD (10/23/2017)   Next INR check:   10/30/2017   Target end date:   Indefinite    Indications    History of embolic stroke [Z86.79]  Atrial fibrillation (CMS-HCC) [I48.91]             Anticoagulation Episode Summary     [facility] check [facility]   [facility]    Preferred lab:       Send INR reminders to:       Comments:         Anticoagulation Care Providers     Provider Role Specialty Phone number    Anuj Denise M.D. Referring Cardiology [phone]    [facility]   [phone]        Anticoagulation Patient Findings      HPI:   Aria Solis seen in clinic today, on anticoagulation therapy with warfarin for Afib and hx of CVA  Confirmed warfarin dosing regimen  Changes to current medical/health status since last appt: none  Denies signs/symptoms of bleeding and/or thrombosis since the last appt.    Denies any interval changes to diet  Denies any interval changes to medications since last appt.   Denies any complications or cost restrictions with current therapy.       A/P   INR  supra-therapeutic.   Will hold today's dose.  Will decrease TWD by 21% by changing schedule to MWF to 2.5mg and all other days 5mg.  Instructed patient on s/sx of bleeding and when to call a healthcare provider.    Elevated pulse is due to fact patient has been out of propranolol for a couple days.  Pt is going to schedule cardiology appointment.    Follow up appointment in 1 week(s).  Scooby Pizarro, Pharm.D.  Pharmacy Practice Resident, PGY1

## 2017-10-24 LAB
IGA SERPL-MCNC: 245 MG/DL (ref 68–408)
TTG IGA SER IA-ACNC: 0 U/ML (ref 0–3)

## 2017-10-25 ENCOUNTER — TELEPHONE (OUTPATIENT)
Dept: MEDICAL GROUP | Facility: MEDICAL CENTER | Age: 77
End: 2017-10-25

## 2017-10-25 NOTE — TELEPHONE ENCOUNTER
----- Message from Jet Sarkar M.D. sent at 10/24/2017  8:00 PM PDT -----  Please notify patient that her celiac test came back negative.  Kidney function and blood counts are stable.  Blood sugar and liver function are normal.  Thyroid is slightly low functioning. We should increase her dose and have her recheck labs in 6 weeks.  Cholesterol is higher than last time. We recommend decreasing saturated fat which are found in meats that come from a cow or pig, and found in creams, cheeses, butter, burr, and fried foods. We recommend more vegetables, fruits, fish, nuts, olive oil and exercising 5 times a week for 30 minutes.  Jet Sarkar M.D.

## 2017-10-30 ENCOUNTER — ANTICOAGULATION VISIT (OUTPATIENT)
Dept: VASCULAR LAB | Facility: MEDICAL CENTER | Age: 77
End: 2017-10-30
Attending: INTERNAL MEDICINE
Payer: MEDICARE

## 2017-10-30 VITALS — DIASTOLIC BLOOD PRESSURE: 82 MMHG | SYSTOLIC BLOOD PRESSURE: 101 MMHG | HEART RATE: 138 BPM

## 2017-10-30 DIAGNOSIS — Z86.73 HISTORY OF EMBOLIC STROKE: ICD-10-CM

## 2017-10-30 LAB
INR BLD: 1.4 (ref 0.9–1.2)
INR PPP: 1.4 (ref 2–3.5)

## 2017-10-30 PROCEDURE — 99212 OFFICE O/P EST SF 10 MIN: CPT

## 2017-10-30 PROCEDURE — 85610 PROTHROMBIN TIME: CPT

## 2017-10-30 NOTE — PROGRESS NOTES
OP Anticoagulation Service Note    Date: 10/30/2017  Vitals:    10/30/17 1419   BP: 101/82   Pulse: (!) 138       Anticoagulation Summary  As of 10/30/2017    INR goal:   2.0-3.0   TTR:   37.9 % (1.8 y)   Today's INR:   1.4!   Maintenance plan:   2.5 mg (5 mg x 0.5) on Mon, Wed, Fri; 5 mg (5 mg x 1) all other days   Weekly total:   27.5 mg   Plan last modified:   Scooby Pizarro, PharmD (10/23/2017)   Next INR check:   11/6/2017   Target end date:   Indefinite    Indications    History of embolic stroke [Z86.79]  Atrial fibrillation (CMS-HCC) [I48.91]             Anticoagulation Episode Summary     INR check location:   [facility]    Preferred lab:       Send INR reminders to:       Comments:         Anticoagulation Care Providers     Provider Role Specialty Phone number    Anuj Denise M.D. Referring Cardiology [phone]    [facility]   [phone]        Anticoagulation Patient Findings      HPI:   Aria Tamar Solis seen in clinic today, on anticoagulation therapy with warfarin for atrial fibrillation.  Confirmed warfarin dosing regimen  Changes to current medical/health status since last appt:   Addition of tizanidine, but did not tolerate and d/c'd after 2 days.    Denies signs/symptoms of bleeding and/or thrombosis since the last appt.    Denies any interval changes to diet  Denies any interval changes to medications since last appt.   Denies any complications or cost restrictions with current therapy.       A/P   INR  sub-therapeutic.   Will bolus today with 5mg.  Patient reports full adherence to medication with no changes in diet or lifestyle.  Educated patient on s/sx of bleeding and how clots present.  May consider MF 2.5mg, 5mg all other days if patient returns therapeutic at next visit.    HR at 138 but pt reports full adherence to propranolol.    Follow up appointment in 1 week(s).  Socoby Pizarro, Pharm.D.  Pharmacy Practice Resident, PGY1

## 2017-11-01 NOTE — TELEPHONE ENCOUNTER
Pt states that she doesn't understand about her thyroid dose increase because she doesn't take any medication for her thyroid. Please advise

## 2017-11-01 NOTE — TELEPHONE ENCOUNTER
I'm sorry for the mistake, for some reason I was thinking she was already taking thyroid medication.  Her thyroid function is slightly low functioning. We could consider starting her on thyroid supplementation if she is having symptoms of low thyroid such as fatigue, weight gain, constipation, excessive dry skin, hair loss, brittle nails.  We do not need to start supplementation, but if she is having symptoms it might be worth trying to see if supplementation improves the symptoms.  Jet Sarkar M.D.

## 2017-11-06 ENCOUNTER — ANTICOAGULATION VISIT (OUTPATIENT)
Dept: VASCULAR LAB | Facility: MEDICAL CENTER | Age: 77
End: 2017-11-06
Attending: INTERNAL MEDICINE
Payer: MEDICARE

## 2017-11-06 VITALS — DIASTOLIC BLOOD PRESSURE: 66 MMHG | HEART RATE: 79 BPM | SYSTOLIC BLOOD PRESSURE: 108 MMHG

## 2017-11-06 DIAGNOSIS — I48.91 ATRIAL FIBRILLATION, UNSPECIFIED TYPE (HCC): ICD-10-CM

## 2017-11-06 DIAGNOSIS — Z86.73 HISTORY OF EMBOLIC STROKE: ICD-10-CM

## 2017-11-06 LAB
INR BLD: 3.5 (ref 0.9–1.2)
INR PPP: 3.5 (ref 2–3.5)

## 2017-11-06 PROCEDURE — 85610 PROTHROMBIN TIME: CPT

## 2017-11-06 PROCEDURE — 99212 OFFICE O/P EST SF 10 MIN: CPT

## 2017-11-06 NOTE — PROGRESS NOTES
OP Anticoagulation Service Note    Date: 11/6/2017  Vitals:    11/06/17 1547   BP: 108/66   Pulse: 79       Anticoagulation Summary  As of 11/6/2017    INR goal:   2.0-3.0   TTR:   38.0 % (1.8 y)   Today's INR:   3.5!   Maintenance plan:   2.5 mg (5 mg x 0.5) on Mon, Fri; 5 mg (5 mg x 1) all other days   Weekly total:   30 mg   Plan last modified:   Maite ReynaD (11/6/2017)   Next INR check:   11/13/2017   Target end date:   Indefinite    Indications    History of embolic stroke [Z86.79]  Atrial fibrillation (CMS-HCC) [I48.91]             Anticoagulation Episode Summary     INR check location[facility]   [facility]    Preferred lab:       Send INR reminders to:       Comments:         Anticoagulation Care Providers     Provider Role Specialty Phone number    Anuj Denise M.D. Referring Cardiology [phone]    [facility]   [phone]        Anticoagulation Patient Findings      HPI:   Aria Tamar Solis seen in clinic today, on anticoagulation therapy with warfarin for embolic stroke and atrial fibrillation.  Confirmed warfarin dosing regimen  Changes to current medical/health status since last appt: none  Denies signs/symptoms of bleeding and/or thrombosis since the last appt.    Denies any interval changes to diet  Denies any interval changes to medications since last appt.   Denies any complications or cost restrictions with current therapy.     Pt reports taking half tablet on past Monday and then 5mg all other days.      A/P   INR  supra-therapeutic.   Will halve today's dose to 2.5mg.  Then take 5mg T/Wed/Thur/Sat/Sun and 2.5mg Friday.  Pt and Pt's  have difficulty recounting which doses are prescribed.  Educated and confirmed understanding of 2.5mg dose on M and F, 5mg all other days.    Follow up appointment in 1 week(s).  Scooby Pizarro, Pharm.D.  Pharmacy Practice Resident, PGY1

## 2017-11-10 ENCOUNTER — PATIENT MESSAGE (OUTPATIENT)
Dept: HEALTH INFORMATION MANAGEMENT | Facility: OTHER | Age: 77
End: 2017-11-10

## 2017-11-13 ENCOUNTER — ANTICOAGULATION VISIT (OUTPATIENT)
Dept: VASCULAR LAB | Facility: MEDICAL CENTER | Age: 77
End: 2017-11-13
Attending: INTERNAL MEDICINE
Payer: MEDICARE

## 2017-11-13 VITALS — HEART RATE: 129 BPM | SYSTOLIC BLOOD PRESSURE: 107 MMHG | DIASTOLIC BLOOD PRESSURE: 60 MMHG

## 2017-11-13 DIAGNOSIS — Z86.73 HISTORY OF EMBOLIC STROKE: ICD-10-CM

## 2017-11-13 LAB
INR BLD: 2.4 (ref 0.9–1.2)
INR PPP: 2.4 (ref 2–3.5)

## 2017-11-13 PROCEDURE — 99211 OFF/OP EST MAY X REQ PHY/QHP: CPT

## 2017-11-13 PROCEDURE — 85610 PROTHROMBIN TIME: CPT

## 2017-11-13 NOTE — PROGRESS NOTES
OP Anticoagulation Service Note    Date: 11/13/2017  Vitals:    11/13/17 1505   BP: 107/60   Pulse: (!) 129       Anticoagulation Summary  As of 11/13/2017    INR goal:   2.0-3.0   TTR:   38.2 % (1.8 y)   Today's INR:   2.4   Maintenance plan:   2.5 mg (5 mg x 0.5) on Mon, Fri; 5 mg (5 mg x 1) all other days   Weekly total:   30 mg   Plan last modified:   Scooby Pizarro, PharmD (11/6/2017)   Next INR check:   11/20/2017   Target end date:   Indefinite    Indications    History of embolic stroke [Z86.79]  Atrial fibrillation (CMS-HCC) [I48.91]             Anticoagulation Episode Summary     INR check location:   [facility]    Preferred lab:       Send INR reminders to:       Comments:         Anticoagulation Care Providers     Provider Role Specialty Phone number    Anuj Denise M.D. Referring Cardiology [phone]    [facility]   [phone]        Anticoagulation Patient Findings  Patient Findings     Negatives:   Signs/symptoms of thrombosis, Signs/symptoms of bleeding, Laboratory test error suspected, Change in health, Change in alcohol use, Change in activity, Upcoming invasive procedure, Emergency department visit, Upcoming dental procedure, Missed doses, Extra doses, Change in medications, Change in diet/appetite, Hospital admission, Bruising, Other complaints          HPI:   Aria Tamar Solis seen in clinic today, on anticoagulation therapy with warfarin for a fib  Confirmed warfarin dosing regimen  Changes to current medical/health status since last appt:   Denies signs/symptoms of bleeding and/or thrombosis since the last appt.    Denies any interval changes to diet  Denies any interval changes to medications since last appt.   Denies any complications or cost restrictions with current therapy.       A/P   INR  therapeutic.   Continue current regimen    Follow up appointment in 1 week(s).  Nereida Colby, MaiteD

## 2017-11-20 ENCOUNTER — ANTICOAGULATION VISIT (OUTPATIENT)
Dept: VASCULAR LAB | Facility: MEDICAL CENTER | Age: 77
End: 2017-11-20
Attending: INTERNAL MEDICINE
Payer: MEDICARE

## 2017-11-20 DIAGNOSIS — I48.91 ATRIAL FIBRILLATION, UNSPECIFIED TYPE (HCC): ICD-10-CM

## 2017-11-20 DIAGNOSIS — Z86.73 HISTORY OF EMBOLIC STROKE: ICD-10-CM

## 2017-11-20 LAB
INR BLD: 2.5 (ref 0.9–1.2)
INR PPP: 2.5 (ref 2–3.5)

## 2017-11-20 PROCEDURE — 85610 PROTHROMBIN TIME: CPT

## 2017-11-20 PROCEDURE — 99211 OFF/OP EST MAY X REQ PHY/QHP: CPT | Performed by: NURSE PRACTITIONER

## 2017-11-20 NOTE — PROGRESS NOTES
Anticoagulation Summary  As of 11/20/2017    INR goal:   2.0-3.0   TTR:   38.8 % (1.8 y)   Today's INR:   2.5   Maintenance plan:   2.5 mg (5 mg x 0.5) on Mon, Fri; 5 mg (5 mg x 1) all other days   Weekly total:   30 mg   Plan last modified:   Scooby Pizarro, PharmD (11/6/2017)   Next INR check:   12/18/2017   Target end date:   Indefinite    Indications    History of embolic stroke [Z86.79]  Atrial fibrillation (CMS-HCC) [I48.91]             Anticoagulation Episode Summary     INR check location[facility]   [facility]    Preferred lab:       Send INR reminders to:       Comments:         Anticoagulation Care Providers     Provider Role Specialty Phone number    Anuj Denise M.D. Referring Cardiology [phone]    [facility]   [phone]        Anticoagulation Patient Findings      HPI:  Aria Solis seen in clinic today for follow up on anticoagulation therapy in the presence of AF/CVA hx. Denies any changes to current medical/health status since last appointment. Denies any medication or diet changes. No current symptoms of bleeding or thrombosis reported.    A/P:   INR therapeutic. Continue current regimen.     Follow up appointment in 4 week(s) per pt.    Next Appointment: Monday, December 18, 2017 at 10:00 am.    Arabella CHANEY

## 2017-12-18 ENCOUNTER — ANTICOAGULATION VISIT (OUTPATIENT)
Dept: VASCULAR LAB | Facility: MEDICAL CENTER | Age: 77
End: 2017-12-18
Attending: INTERNAL MEDICINE
Payer: MEDICARE

## 2017-12-18 DIAGNOSIS — Z86.73 HISTORY OF EMBOLIC STROKE: ICD-10-CM

## 2017-12-18 LAB
INR BLD: 2.8 (ref 0.9–1.2)
INR PPP: 2.8 (ref 2–3.5)

## 2017-12-18 PROCEDURE — 85610 PROTHROMBIN TIME: CPT

## 2017-12-18 PROCEDURE — 99211 OFF/OP EST MAY X REQ PHY/QHP: CPT

## 2017-12-18 NOTE — PROGRESS NOTES
Anticoagulation Summary  As of 12/18/2017    INR goal:   2.0-3.0   TTR:   41.3 % (1.9 y)   Today's INR:   2.8   Maintenance plan:   2.5 mg (5 mg x 0.5) on Mon, Fri; 5 mg (5 mg x 1) all other days   Weekly total:   30 mg   Plan last modified:   Scooby Pizarro, PharmD (11/6/2017)   Next INR check:   1/22/2018   Target end date:   Indefinite    Indications    History of embolic stroke [Z86.79]  Atrial fibrillation (CMS-HCC) [I48.91]             Anticoagulation Episode Summary     INR check location[facility]   [facility]    Preferred lab:       Send INR reminders to:       Comments:         Anticoagulation Care Providers     Provider Role Specialty Phone number    Anuj Denise M.D. Referring Cardiology [phone]    [facility]   [phone]        Anticoagulation Patient Findings  Patient Findings     Negatives:   Signs/symptoms of thrombosis, Signs/symptoms of bleeding, Laboratory test error suspected, Change in health, Change in alcohol use, Change in activity, Upcoming invasive procedure, Emergency department visit, Upcoming dental procedure, Missed doses, Extra doses, Change in medications, Change in diet/appetite, Hospital admission, Bruising, Other complaints        HPI:   Seen in clinic today, on anticoagulation therapy with warfarin for stroke prevention due to history of atrial fibrillation    Previous INR  2.5 on 11/20/2017     Does patient have any changes to current medical/health status since last appt (Y/N):  NO  Does patient have any signs/symptoms of bleeding and/or thrombosis since the last appt (Y/N):  NO  Does patient have any interval changes to diet or medications since last appt (Y/N):  NO  Are there any complications or cost restrictions with current therapy (Y/N):  NO      Vitals:  Patient declines BP/vitals check today      Asssessment:       INR therapeuti     Plan:  Instructed patient to continue with current warfarin regimen.       Follow up:  Because warfarin is  a high risk medication and current CHEST guidelines recommend regular monitoring intervals (few days up to 12 weeks), will have patient return to clinic in 5 weeks to recheck INR.    Susan Gomez, MaiteD

## 2018-01-22 ENCOUNTER — ANTICOAGULATION VISIT (OUTPATIENT)
Dept: VASCULAR LAB | Facility: MEDICAL CENTER | Age: 78
End: 2018-01-22
Attending: INTERNAL MEDICINE
Payer: MEDICARE

## 2018-01-22 VITALS — SYSTOLIC BLOOD PRESSURE: 88 MMHG | DIASTOLIC BLOOD PRESSURE: 67 MMHG | HEART RATE: 89 BPM

## 2018-01-22 DIAGNOSIS — I48.91 ATRIAL FIBRILLATION, UNSPECIFIED TYPE (HCC): ICD-10-CM

## 2018-01-22 DIAGNOSIS — Z86.73 HISTORY OF EMBOLIC STROKE: ICD-10-CM

## 2018-01-22 LAB
INR BLD: 4.6 (ref 0.9–1.2)
INR PPP: 4.6 (ref 2–3.5)

## 2018-01-22 PROCEDURE — 99212 OFFICE O/P EST SF 10 MIN: CPT

## 2018-01-22 PROCEDURE — 85610 PROTHROMBIN TIME: CPT

## 2018-01-22 NOTE — PROGRESS NOTES
Anticoagulation Summary  As of 1/22/2018    INR goal:   2.0-3.0   TTR:   39.9 % (2 y)   Today's INR:   4.6!   Maintenance plan:   2.5 mg (5 mg x 0.5) on Mon, Fri; 5 mg (5 mg x 1) all other days   Weekly total:   30 mg   Plan last modified:   Scooby Pizarro, PharmD (11/6/2017)   Next INR check:   6/15/2018   Target end date:   Indefinite    Indications    History of embolic stroke [Z86.79]  Atrial fibrillation (CMS-HCC) [I48.91]             Anticoagulation Episode Summary     INR check location[facility]   [facility]    Preferred lab:       Send INR reminders to:       Comments:         Anticoagulation Care Providers     Provider Role Specialty Phone number    Anuj Denise M.D. Referring Cardiology [phone]    [facility]   [phone]        Anticoagulation Patient Findings  Patient Findings     Positives:   Change in alcohol use    Negatives:   Signs/symptoms of thrombosis, Signs/symptoms of bleeding, Laboratory test error suspected, Change in health, Change in activity, Upcoming invasive procedure, Emergency department visit, Upcoming dental procedure, Missed doses, Extra doses, Change in medications, Change in diet/appetite, Hospital admission, Bruising, Other complaints        HPI:   Seen in clinic today, on anticoagulation therapy with warfarin for stroke prevention due to history of atrial fibrillation    Previous INR  2.8 on 12/18/2018     Does patient have any changes to current medical/health status since last appt (Y/N):  NO  Does patient have any signs/symptoms of bleeding and/or thrombosis since the last appt (Y/N):  NO  Does patient have any interval changes to diet or medications since last appt (Y/N):  NO  Are there any complications or cost restrictions with current therapy (Y/N):  NO      Vitals:  Patient BP/vitals in chart, not feeling lightheaded, normally her BP is low     Asssessment:       INR SUPRAtherapeutic therefore putting patient at higher risk of  bleeding  Reason(s) for out of range INR today: alcoholic drink over the weekend     Plan:  Instructed patient to hold today's dose, and decrease tomorrow's dose by 50%, then resume current warfarin regimen.       Follow up:  Because warfarin is a high risk medication and current CHEST guidelines recommend regular monitoring intervals (few days up to 12 weeks), will have patient return to clinic in 5 months (per patient request) to recheck INR.     She is going on a trip across the country to [place] for 4-5 months. She was instructed on the importance of getting her INR checked while she is away and she claims she will try to go to a lab but does not have any definitive plans for it. She has made a similar trip last year and was gone for 4 months without visiting the clinic. Next appointment made for June 15th 2018.  She is instructed to visit the ED if she experiences any unusual bleeding/bruising or chest pain/sever headaches.     Susan Gomez, PharmD

## 2018-02-28 DIAGNOSIS — I48.91 ATRIAL FIBRILLATION, UNSPECIFIED TYPE (HCC): ICD-10-CM

## 2018-03-05 ENCOUNTER — OFFICE VISIT (OUTPATIENT)
Dept: MEDICAL GROUP | Facility: MEDICAL CENTER | Age: 78
End: 2018-03-05
Payer: MEDICARE

## 2018-03-05 VITALS
RESPIRATION RATE: 14 BRPM | SYSTOLIC BLOOD PRESSURE: 106 MMHG | HEIGHT: 67 IN | DIASTOLIC BLOOD PRESSURE: 64 MMHG | OXYGEN SATURATION: 91 % | BODY MASS INDEX: 31.08 KG/M2 | HEART RATE: 82 BPM | TEMPERATURE: 96.8 F | WEIGHT: 198 LBS

## 2018-03-05 DIAGNOSIS — I50.22 CHRONIC SYSTOLIC CONGESTIVE HEART FAILURE (HCC): ICD-10-CM

## 2018-03-05 DIAGNOSIS — M47.817 LUMBOSACRAL SPONDYLOSIS WITHOUT MYELOPATHY: ICD-10-CM

## 2018-03-05 DIAGNOSIS — I48.91 ATRIAL FIBRILLATION, UNSPECIFIED TYPE (HCC): ICD-10-CM

## 2018-03-05 PROCEDURE — 99214 OFFICE O/P EST MOD 30 MIN: CPT | Performed by: FAMILY MEDICINE

## 2018-03-05 RX ORDER — LISINOPRIL 2.5 MG/1
2.5 TABLET ORAL DAILY
Qty: 90 TAB | Refills: 3 | Status: SHIPPED | OUTPATIENT
Start: 2018-03-05 | End: 2018-07-13

## 2018-03-05 RX ORDER — HYDROCODONE BITARTRATE AND ACETAMINOPHEN 5; 325 MG/1; MG/1
1 TABLET ORAL 2 TIMES DAILY PRN
Qty: 60 TAB | Refills: 0 | Status: SHIPPED | OUTPATIENT
Start: 2018-03-05 | End: 2018-03-05 | Stop reason: SDUPTHER

## 2018-03-05 RX ORDER — HYDROCODONE BITARTRATE AND ACETAMINOPHEN 5; 325 MG/1; MG/1
1 TABLET ORAL 2 TIMES DAILY PRN
Qty: 60 TAB | Refills: 0 | Status: SHIPPED | OUTPATIENT
Start: 2018-04-02 | End: 2018-03-05 | Stop reason: SDUPTHER

## 2018-03-05 RX ORDER — HYDROCODONE BITARTRATE AND ACETAMINOPHEN 5; 325 MG/1; MG/1
1 TABLET ORAL 2 TIMES DAILY PRN
Qty: 60 TAB | Refills: 0 | Status: SHIPPED | OUTPATIENT
Start: 2018-04-30 | End: 2018-06-26 | Stop reason: SDUPTHER

## 2018-03-05 ASSESSMENT — ACTIVITIES OF DAILY LIVING (ADL): BATHING_REQUIRES_ASSISTANCE: 0

## 2018-03-05 ASSESSMENT — PATIENT HEALTH QUESTIONNAIRE - PHQ9: CLINICAL INTERPRETATION OF PHQ2 SCORE: 0

## 2018-03-05 NOTE — PROGRESS NOTES
Subjective:   Aria Solis is a 77 y.o. female here today for back pain, a. Fib, CHF    Lumbosacral spondylosis without myelopathy  Chronic back pain, cannot cook food without having significant back pain.  MRI 2012:  1. Mild degenerative changes in the lumbar spine as described above.  2. There is left foraminal disk extrusion at L1-2.  The extruded disk fragment migrated upwards into the left L1 neural foramina.  There is possible impingement of the exiting left L1 nerve root at the neural foramina.  3. There is a right sided facet joint arthropathy at L4-5 causing moderate stenosis of the right lateral recess.  There is also possible impingement of the exiting right L5 nerve root at the lateral recess.  4. There is degenerative rightward translation of L2 on L3.  There is also mild anterior subluxation of L2 on 3 and L4 on 5.    Current pain control: poor, 10/10 without pain medication. With Vicodin 3/10  Adverse effects: none  Physical functioning: good  Mood: good  Family and social relationships: good  Sleep pattern: good  Overall functioning: good  Nonnarcotic treatments that are being used: spinal stimulator, tylenol, ibuprofen, gabapentin, zanaflex, ice, heat, ablation, epidurals.    Pain management agreement initiated and signed on: today  Last dose of narcotic medication: many months ago  Most recent urine drug screen done today, which was reviewed today and is consistent with prescribed medications without any concerns.  Aberrant Behaviors: None  I have reviewed the medical records, the Prescription Monitoring Program and I have determined that Norco 5 mg twice daily is medically indicated.    I have advised patient to keep medication in a safe place and to not drive with medication.    Atrial fibrillation  Patient is on propanolol  mg daily for her atrial fibrillation. She is also on Coumadin.    Chronic systolic congestive heart failure  Patient is on propanolol  mg daily and  lisinopril 10 mg daily.  She states that she gets lightheaded when standing up. Her blood pressure is low normal.         Current medicines (including changes today)  Current Outpatient Prescriptions   Medication Sig Dispense Refill   • lisinopril (PRINIVIL) 2.5 MG Tab Take 1 Tab by mouth every day. 90 Tab 3   • [START ON 4/30/2018] HYDROcodone-acetaminophen (NORCO) 5-325 MG Tab per tablet Take 1 Tab by mouth 2 times a day as needed (severe pain) for up to 30 days. 60 Tab 0   • warfarin (COUMADIN) 5 MG Tab TAKE ONE-HALF (1/2) TO ONE TABLET DAILY OR AS DIRECTED BY COUMADIN CLINIC 90 Tab 3   • propranolol CR (INDERAL LA) 120 MG CAPSULE SR 24 HR Take 1 Cap by mouth every day. 90 Cap 3   • gabapentin (NEURONTIN) 600 MG tablet Take 1 Tab by mouth 2 times a day. 180 Tab 3   • divalproex ER (DEPAKOTE ER) 500 MG TABLET SR 24 HR Take 1 Tab by mouth 2 Times a Day. 180 Tab 3   • atorvastatin (LIPITOR) 10 MG Tab Take 1 Tab by mouth every bedtime. 90 Tab 3   • zolpidem (AMBIEN) 5 MG Tab Take 1 Tab by mouth at bedtime as needed for Sleep. 90 Tab 1   • cyclosporin (RESTASIS) 0.05 % ophthalmic emulsion Place 1 Drop in both eyes 2 times a day. 3 Each 3   • furosemide (LASIX) 20 MG Tab Take 1 Tab by mouth every day. 90 Tab 3   • aspirin EC 81 MG EC tablet Take 1 Tab by mouth every day. 30 Tab 1   • beclomethasone (QVAR) 40 MCG/ACT inhaler Inhale 1 Puff by mouth 2 Times a Day. (Patient taking differently: Inhale 1 Puff by mouth as needed.) 40 Inhaler 3   • Multiple Vitamins-Minerals (OCUVITE ADULT 50+ PO) Take  by mouth every day.       No current facility-administered medications for this visit.      She  has a past medical history of Atrial fibrillation (CMS-[facility]) (February 2014); Back pain; CAD (coronary artery disease) (February 2014); Cerebellar stroke, acute (CMS-[facility]) (February 2014); Cholesterol blood decreased; Chronic anticoagulation; H/O total knee replacement (1996); Hyperlipemia; Insomnia; Medical home; Migraine without  "aura, with intractable migraine, so stated, without mention of status migrainosus; Osteoarthritis of more than one site; Postmenopausal; Sleep apnea; and Stroke (CMS-HCC).    ROS   No chest pain, no shortness of breath, no abdominal pain       Objective:     Blood pressure 106/64, pulse 82, temperature 36 °C (96.8 °F), resp. rate 14, height 1.689 m (5' 6.5\"), weight 89.8 kg (198 lb), SpO2 91 %. Body mass index is 31.48 kg/m².   Physical Exam:  Constitutional: Alert, no distress.  Skin: Warm, dry, good turgor, no rashes in visible areas.  Eye: Equal, round and reactive, conjunctiva clear, lids normal.  Psych: Alert and oriented x3, normal affect and mood.        Assessment and Plan:   The following treatment plan was discussed    1. Chronic systolic congestive heart failure (CMS-HCC)  Decrease lisinopril from 10 mg daily to 2.5 mg daily. Continue Propranolol  mg daily.    2. Atrial fibrillation, unspecified type (CMS-HCC)  Controlled with Propranolol and coumadin.    3. Lumbosacral spondylosis without myelopathy  Restart Norco 5 mg twice daily. Consent for opiates signed today. [facility] done today.  - CONSENT FOR OPIATE PRESCRIPTION  - HYDROcodone-acetaminophen (NORCO) 5-325 MG Tab per tablet; Take 1 Tab by mouth 2 times a day as needed (severe pain) for up to 30 days.  Dispense: 60 Tab; Refill: 0  - [facility] SCREEN; Future      Followup: Return in about 3 months (around 6/5/2018) for Pain medication refill, HTN.         "

## 2018-03-05 NOTE — ASSESSMENT & PLAN NOTE
Chronic back pain, cannot cook food without having significant back pain.  MRI 2012:  1. Mild degenerative changes in the lumbar spine as described above.  2. There is left foraminal disk extrusion at L1-2.  The extruded disk fragment migrated upwards into the left L1 neural foramina.  There is possible impingement of the exiting left L1 nerve root at the neural foramina.  3. There is a right sided facet joint arthropathy at L4-5 causing moderate stenosis of the right lateral recess.  There is also possible impingement of the exiting right L5 nerve root at the lateral recess.  4. There is degenerative rightward translation of L2 on L3.  There is also mild anterior subluxation of L2 on 3 and L4 on 5.    Current pain control: poor, 10/10 without pain medication. With Vicodin 3/10  Adverse effects: none  Physical functioning: good  Mood: good  Family and social relationships: good  Sleep pattern: good  Overall functioning: good  Nonnarcotic treatments that are being used: spinal stimulator, tylenol, ibuprofen, gabapentin, zanaflex, ice, heat, ablation, epidurals.    Pain management agreement initiated and signed on: today  Last dose of narcotic medication: many months ago  Most recent urine drug screen done today, which was reviewed today and is consistent with prescribed medications without any concerns.  Aberrant Behaviors: None  I have reviewed the medical records, the Prescription Monitoring Program and I have determined that Norco 5 mg twice daily is medically indicated.    I have advised patient to keep medication in a safe place and to not drive with medication.

## 2018-03-05 NOTE — ASSESSMENT & PLAN NOTE
Patient is on propanolol  mg daily and lisinopril 10 mg daily.  She states that she gets lightheaded when standing up. Her blood pressure is low normal.

## 2018-03-08 ENCOUNTER — TELEPHONE (OUTPATIENT)
Dept: MEDICAL GROUP | Facility: MEDICAL CENTER | Age: 78
End: 2018-03-08

## 2018-03-08 NOTE — TELEPHONE ENCOUNTER
Pt notified. Pt would like for you to know that she does not drink alcohol. Pt states she only had a glass of wine with dinner. Pt states she is aware of the dangers of drinking alcohol with narcotics

## 2018-03-08 NOTE — TELEPHONE ENCOUNTER
----- Message from Jet Sarkar M.D. sent at 3/8/2018 12:43 PM PST -----  Please notify patient that her urine drug test came back positive for alcohol. If we see alcohol again on her drug test, we will not be able to prescribe her narcotic pain medications, as the combination of alcohol and narcotics can be deadly.  Jet Sarkar M.D.

## 2018-06-26 ENCOUNTER — OFFICE VISIT (OUTPATIENT)
Dept: MEDICAL GROUP | Facility: MEDICAL CENTER | Age: 78
End: 2018-06-26
Payer: MEDICARE

## 2018-06-26 VITALS
OXYGEN SATURATION: 93 % | TEMPERATURE: 96.9 F | DIASTOLIC BLOOD PRESSURE: 68 MMHG | SYSTOLIC BLOOD PRESSURE: 124 MMHG | WEIGHT: 189 LBS | HEART RATE: 95 BPM | HEIGHT: 67 IN | BODY MASS INDEX: 29.66 KG/M2

## 2018-06-26 DIAGNOSIS — E66.9 OBESITY (BMI 30-39.9): ICD-10-CM

## 2018-06-26 DIAGNOSIS — F51.01 PRIMARY INSOMNIA: ICD-10-CM

## 2018-06-26 DIAGNOSIS — N18.30 CKD (CHRONIC KIDNEY DISEASE) STAGE 3, GFR 30-59 ML/MIN (HCC): Chronic | ICD-10-CM

## 2018-06-26 DIAGNOSIS — M47.817 LUMBOSACRAL SPONDYLOSIS WITHOUT MYELOPATHY: ICD-10-CM

## 2018-06-26 DIAGNOSIS — H35.30 MACULAR DEGENERATION OF BOTH EYES, UNSPECIFIED TYPE: ICD-10-CM

## 2018-06-26 DIAGNOSIS — E78.2 MIXED HYPERLIPIDEMIA: ICD-10-CM

## 2018-06-26 DIAGNOSIS — I48.91 ATRIAL FIBRILLATION, UNSPECIFIED TYPE (HCC): ICD-10-CM

## 2018-06-26 DIAGNOSIS — G62.9 NEUROPATHY: ICD-10-CM

## 2018-06-26 PROCEDURE — 99214 OFFICE O/P EST MOD 30 MIN: CPT | Performed by: FAMILY MEDICINE

## 2018-06-26 RX ORDER — HYDROCODONE BITARTRATE AND ACETAMINOPHEN 5; 325 MG/1; MG/1
1 TABLET ORAL 2 TIMES DAILY PRN
Qty: 60 TAB | Refills: 0 | Status: SHIPPED | OUTPATIENT
Start: 2018-06-26 | End: 2018-06-26 | Stop reason: SDUPTHER

## 2018-06-26 RX ORDER — ZOLPIDEM TARTRATE 5 MG/1
5 TABLET ORAL NIGHTLY PRN
Qty: 90 TAB | Refills: 1 | Status: SHIPPED
Start: 2018-06-26 | End: 2019-04-22 | Stop reason: SDUPTHER

## 2018-06-26 RX ORDER — ASPIRIN 325 MG
325 TABLET ORAL DAILY
COMMUNITY
Start: 2018-06-26 | End: 2019-05-07

## 2018-06-26 RX ORDER — METOPROLOL SUCCINATE 50 MG/1
75 TABLET, EXTENDED RELEASE ORAL DAILY
Qty: 135 TAB | Refills: 3 | Status: SHIPPED | OUTPATIENT
Start: 2018-06-26 | End: 2018-07-13

## 2018-06-26 RX ORDER — HYDROCODONE BITARTRATE AND ACETAMINOPHEN 5; 325 MG/1; MG/1
1 TABLET ORAL 2 TIMES DAILY PRN
Qty: 60 TAB | Refills: 0 | Status: SHIPPED | OUTPATIENT
Start: 2018-08-21 | End: 2019-04-22 | Stop reason: SDUPTHER

## 2018-06-26 RX ORDER — HYDROCODONE BITARTRATE AND ACETAMINOPHEN 5; 325 MG/1; MG/1
1 TABLET ORAL 2 TIMES DAILY PRN
Qty: 60 TAB | Refills: 0 | Status: SHIPPED | OUTPATIENT
Start: 2018-07-24 | End: 2018-06-26 | Stop reason: SDUPTHER

## 2018-06-26 NOTE — ASSESSMENT & PLAN NOTE
Has been having neuropathy in bilateral feet, but now migrating up to ankles bilaterally. Has been on Lyrica in the past, but gabapentin 600 mg once daily has been as helpful.

## 2018-06-26 NOTE — ASSESSMENT & PLAN NOTE
She was told by her ophthalmologist that Metformin is being studied for macular degeneration.  She is interested in trying a prescription of metformin. Patient cannot drive because of severe macular degeneration.

## 2018-06-26 NOTE — ASSESSMENT & PLAN NOTE
Chronic back pain, cannot cook food without having significant back pain.  MRI 2012:  1. Mild degenerative changes in the lumbar spine as described above.  2. There is left foraminal disk extrusion at L1-2.  The extruded disk fragment migrated upwards into the left L1 neural foramina.  There is possible impingement of the exiting left L1 nerve root at the neural foramina.  3. There is a right sided facet joint arthropathy at L4-5 causing moderate stenosis of the right lateral recess.  There is also possible impingement of the exiting right L5 nerve root at the lateral recess.  4. There is degenerative rightward translation of L2 on L3.  There is also mild anterior subluxation of L2 on 3 and L4 on 5.    Current pain control: poor, 10/10 without pain medication. With Vicodin 3/10  Adverse effects: none  Physical functioning: good  Mood: good  Family and social relationships: good  Sleep pattern: good  Overall functioning: good  Nonnarcotic treatments that are being used: spinal stimulator, tylenol, ibuprofen, gabapentin, zanaflex, ice, heat, ablation, epidurals.    Pain management agreement initiated and signed on: today  Last dose of narcotic medication: 3-5 days ago  Most recent urine drug screen done today, which was reviewed today and was positive for alcohol, but denies drinking.  Aberrant Behaviors: None  I have reviewed the medical records, the Prescription Monitoring Program and I have determined that Norco 5 mg twice daily is medically indicated.    I have advised patient to keep medication in a safe place and to not drive with medication.

## 2018-06-26 NOTE — PROGRESS NOTES
Subjective:   Aria Solis is a 77 y.o. female here today for lumbar spine pain    Atrial fibrillation  Has not INR since January 2018 and taking coumadin. Last INR was 4.5.  She states she travels a lot and would not be reliable in checking her INR.  Alternative anticoagulation medication such as Xarelto is too expensive for her.  Patient was on metoprolol X are 75 mg daily but was switched propanolol because of tremors.  She has not been taking propanolol and her tremors have resolved.    Neuropathy  Has been having neuropathy in bilateral feet, but now migrating up to ankles bilaterally. Has been on Lyrica in the past, but gabapentin 600 mg once daily has been as helpful.    Obesity (BMI 30-39.9)  Weight has gone down 9 pounds in the last 3 months.    Macular degeneration of both eyes  She was told by her ophthalmologist that Metformin is being studied for macular degeneration.  She is interested in trying a prescription of metformin. Patient cannot drive because of severe macular degeneration.    Lumbosacral spondylosis without myelopathy  Chronic back pain, cannot cook food without having significant back pain.  MRI 2012:  1. Mild degenerative changes in the lumbar spine as described above.  2. There is left foraminal disk extrusion at L1-2.  The extruded disk fragment migrated upwards into the left L1 neural foramina.  There is possible impingement of the exiting left L1 nerve root at the neural foramina.  3. There is a right sided facet joint arthropathy at L4-5 causing moderate stenosis of the right lateral recess.  There is also possible impingement of the exiting right L5 nerve root at the lateral recess.  4. There is degenerative rightward translation of L2 on L3.  There is also mild anterior subluxation of L2 on 3 and L4 on 5.    Current pain control: poor, 10/10 without pain medication. With Vicodin 3/10  Adverse effects: none  Physical functioning: good  Mood: good  Family and social  relationships: good  Sleep pattern: good  Overall functioning: good  Nonnarcotic treatments that are being used: spinal stimulator, tylenol, ibuprofen, gabapentin, zanaflex, ice, heat, ablation, epidurals.    Pain management agreement initiated and signed on: today  Last dose of narcotic medication: 3-5 days ago  Most recent urine drug screen done today, which was reviewed today and was positive for alcohol, but denies drinking.  Aberrant Behaviors: None  I have reviewed the medical records, the Prescription Monitoring Program and I have determined that Norco 5 mg twice daily is medically indicated.    I have advised patient to keep medication in a safe place and to not drive with medication.    CKD (chronic kidney disease) stage 3, GFR 30-59 ml/min  Patient has known chronic kidney disease stage III, she has not had her labs done recently.    Insomnia  Patient seldomly uses Ambien 5 mg but is requesting a refill.         Current medicines (including changes today)  Current Outpatient Prescriptions   Medication Sig Dispense Refill   • [START ON 8/21/2018] HYDROcodone-acetaminophen (NORCO) 5-325 MG Tab per tablet Take 1 Tab by mouth 2 times a day as needed (severe pain) for up to 30 days. 60 Tab 0   • zolpidem (AMBIEN) 5 MG Tab Take 1 Tab by mouth at bedtime as needed for Sleep for up to 90 days. 90 Tab 1   • aspirin (ASA) 325 MG Tab Take 1 Tab by mouth every day.     • metoprolol SR (TOPROL XL) 50 MG TABLET SR 24 HR Take 1.5 Tabs by mouth every day. 135 Tab 3   • lisinopril (PRINIVIL) 2.5 MG Tab Take 1 Tab by mouth every day. 90 Tab 3   • gabapentin (NEURONTIN) 600 MG tablet Take 1 Tab by mouth 2 times a day. 180 Tab 3   • divalproex ER (DEPAKOTE ER) 500 MG TABLET SR 24 HR Take 1 Tab by mouth 2 Times a Day. 180 Tab 3   • atorvastatin (LIPITOR) 10 MG Tab Take 1 Tab by mouth every bedtime. 90 Tab 3   • cyclosporin (RESTASIS) 0.05 % ophthalmic emulsion Place 1 Drop in both eyes 2 times a day. 3 Each 3   • furosemide  "(LASIX) 20 MG Tab Take 1 Tab by mouth every day. 90 Tab 3   • beclomethasone (QVAR) 40 MCG/ACT inhaler Inhale 1 Puff by mouth 2 Times a Day. (Patient taking differently: Inhale 1 Puff by mouth as needed.) 40 Inhaler 3   • Multiple Vitamins-Minerals (OCUVITE ADULT 50+ PO) Take  by mouth every day.       No current facility-administered medications for this visit.      She  has a past medical history of Atrial fibrillation ([facility]) (February 2014); Back pain; CAD (coronary artery disease) (February 2014); Cerebellar stroke, acute ([facility]) (February 2014); Cholesterol blood decreased; Chronic anticoagulation; H/O total knee replacement (1996); Hyperlipemia; Insomnia; Medical home; Migraine without aura, with intractable migraine, so stated, without mention of status migrainosus; Osteoarthritis of more than one site; Postmenopausal; Sleep apnea; and Stroke ([facility]).    ROS   No chest pain, no shortness of breath       Objective:     Blood pressure 124/68, pulse 95, temperature 36.1 °C (96.9 °F), height 1.689 m (5' 6.5\"), weight 85.7 kg (189 lb), SpO2 93 %, not currently breastfeeding. Body mass index is 30.05 kg/m².   Physical Exam:  Constitutional: Alert, no distress.  Skin: Warm, dry, good turgor, no rashes in visible areas.  Eye: Equal, round and reactive, conjunctiva clear, lids normal.  Psych: Alert and oriented x3, normal affect and mood.        Assessment and Plan:   The following treatment plan was discussed    1. Atrial fibrillation, unspecified type (HCC)  Patient is not compliant with propanolol and INR checks.  Patient would like to switch to aspirin.  I will also switch patient to metoprolol because tremors are no longer an issue.  - metoprolol SR (TOPROL XL) 50 MG TABLET SR 24 HR; Take 1.5 Tabs by mouth every day.  Dispense: 135 Tab; Refill: 3    2. Lumbosacral spondylosis without myelopathy  Check [place] drug screen.  Orestes 5 mg twice daily ordered for next 3 months.  Follow-up in approximately 3 months for " refills.  - HYDROcodone-acetaminophen (NORCO) 5-325 MG Tab per tablet; Take 1 Tab by mouth 2 times a day as needed (severe pain) for up to 30 days.  Dispense: 60 Tab; Refill: 0  - [facility]IN [facility]NAGEMENT SCREEN; Future    3. Neuropathy (HCC)  Controlled.  Continue gabapentin.    4. Primary insomnia  Controlled. Continue Ambien.  - zolpidem (AMBIEN) 5 MG Tab; Take 1 Tab by mouth at bedtime as needed for Sleep for up to 90 days.  Dispense: 90 Tab; Refill: 1    5. Obesity (BMI 30-39.9)  - Patient identified as having weight management issue.  Appropriate orders and counseling given.    6. CKD (chronic kidney disease) stage 3, GFR 30-59 ml/min  Check labs and call with results.  - CBC WITH DIFFERENTIAL; Future  - COMP METABOLIC PANEL; Future    7. Mixed hyperlipidemia  Check labs and call with results.  - COMP METABOLIC PANEL; Future  - TSH WITH REFLEX TO FT4; Future  - LIPID PROFILE; Future    8. Macular degeneration of both eyes, unspecified type  If creatinine allows, we will do a trial of metformin as off label use, which patient understands.      Followup: Return in about 3 months (around 9/26/2018) for Pain medication refill.

## 2018-06-26 NOTE — ASSESSMENT & PLAN NOTE
Has not INR since January 2018 and taking coumadin. Last INR was 4.5.  She states she travels a lot and would not be reliable in checking her INR.  Alternative anticoagulation medication such as Xarelto is too expensive for her.  Patient was on metoprolol X are 75 mg daily but was switched propanolol because of tremors.  She has not been taking propanolol and her tremors have resolved.

## 2018-07-05 ENCOUNTER — HOSPITAL ENCOUNTER (OUTPATIENT)
Dept: LAB | Facility: MEDICAL CENTER | Age: 78
End: 2018-07-05
Attending: FAMILY MEDICINE
Payer: MEDICARE

## 2018-07-05 DIAGNOSIS — E78.2 MIXED HYPERLIPIDEMIA: ICD-10-CM

## 2018-07-05 DIAGNOSIS — N18.30 CKD (CHRONIC KIDNEY DISEASE) STAGE 3, GFR 30-59 ML/MIN (HCC): Chronic | ICD-10-CM

## 2018-07-05 LAB
ALBUMIN SERPL BCP-MCNC: 4.3 G/DL (ref 3.2–4.9)
ALBUMIN/GLOB SERPL: 1.3 G/DL
ALP SERPL-CCNC: 76 U/L (ref 30–99)
ALT SERPL-CCNC: 8 U/L (ref 2–50)
ANION GAP SERPL CALC-SCNC: 9 MMOL/L (ref 0–11.9)
AST SERPL-CCNC: 12 U/L (ref 12–45)
BASOPHILS # BLD AUTO: 0.4 % (ref 0–1.8)
BASOPHILS # BLD: 0.04 K/UL (ref 0–0.12)
BILIRUB SERPL-MCNC: 1.4 MG/DL (ref 0.1–1.5)
BUN SERPL-MCNC: 20 MG/DL (ref 8–22)
CALCIUM SERPL-MCNC: 9.9 MG/DL (ref 8.5–10.5)
CHLORIDE SERPL-SCNC: 101 MMOL/L (ref 96–112)
CHOLEST SERPL-MCNC: 212 MG/DL (ref 100–199)
CO2 SERPL-SCNC: 26 MMOL/L (ref 20–33)
CREAT SERPL-MCNC: 1.12 MG/DL (ref 0.5–1.4)
EOSINOPHIL # BLD AUTO: 0.08 K/UL (ref 0–0.51)
EOSINOPHIL NFR BLD: 0.8 % (ref 0–6.9)
ERYTHROCYTE [DISTWIDTH] IN BLOOD BY AUTOMATED COUNT: 50.3 FL (ref 35.9–50)
GLOBULIN SER CALC-MCNC: 3.4 G/DL (ref 1.9–3.5)
GLUCOSE SERPL-MCNC: 125 MG/DL (ref 65–99)
HCT VFR BLD AUTO: 49.3 % (ref 37–47)
HDLC SERPL-MCNC: 40 MG/DL
HGB BLD-MCNC: 15.9 G/DL (ref 12–16)
IMM GRANULOCYTES # BLD AUTO: 0.03 K/UL (ref 0–0.11)
IMM GRANULOCYTES NFR BLD AUTO: 0.3 % (ref 0–0.9)
LDLC SERPL CALC-MCNC: 142 MG/DL
LYMPHOCYTES # BLD AUTO: 3.23 K/UL (ref 1–4.8)
LYMPHOCYTES NFR BLD: 33 % (ref 22–41)
MCH RBC QN AUTO: 34 PG (ref 27–33)
MCHC RBC AUTO-ENTMCNC: 32.3 G/DL (ref 33.6–35)
MCV RBC AUTO: 105.6 FL (ref 81.4–97.8)
MONOCYTES # BLD AUTO: 1.05 K/UL (ref 0–0.85)
MONOCYTES NFR BLD AUTO: 10.7 % (ref 0–13.4)
NEUTROPHILS # BLD AUTO: 5.37 K/UL (ref 2–7.15)
NEUTROPHILS NFR BLD: 54.8 % (ref 44–72)
NRBC # BLD AUTO: 0 K/UL
NRBC BLD-RTO: 0 /100 WBC
PLATELET # BLD AUTO: 164 K/UL (ref 164–446)
PMV BLD AUTO: 12.5 FL (ref 9–12.9)
POTASSIUM SERPL-SCNC: 3.9 MMOL/L (ref 3.6–5.5)
PROT SERPL-MCNC: 7.7 G/DL (ref 6–8.2)
RBC # BLD AUTO: 4.67 M/UL (ref 4.2–5.4)
SODIUM SERPL-SCNC: 136 MMOL/L (ref 135–145)
TRIGL SERPL-MCNC: 152 MG/DL (ref 0–149)
TSH SERPL DL<=0.005 MIU/L-ACNC: 5.02 UIU/ML (ref 0.38–5.33)
WBC # BLD AUTO: 9.8 K/UL (ref 4.8–10.8)

## 2018-07-05 PROCEDURE — 84443 ASSAY THYROID STIM HORMONE: CPT

## 2018-07-05 PROCEDURE — 80061 LIPID PANEL: CPT

## 2018-07-05 PROCEDURE — 85025 COMPLETE CBC W/AUTO DIFF WBC: CPT

## 2018-07-05 PROCEDURE — 36415 COLL VENOUS BLD VENIPUNCTURE: CPT

## 2018-07-05 PROCEDURE — 80053 COMPREHEN METABOLIC PANEL: CPT

## 2018-07-06 ENCOUNTER — TELEPHONE (OUTPATIENT)
Dept: MEDICAL GROUP | Facility: MEDICAL CENTER | Age: 78
End: 2018-07-06

## 2018-07-06 NOTE — TELEPHONE ENCOUNTER
----- Message from Jet Sarkar M.D. sent at 7/6/2018  7:42 AM PDT -----  Please notify patient that because her kidney function is slightly reduced, it would not be safe to start metformin.  Her thyroid function, liver function are normal.  Blood counts are stable.  Cholesterol is elevated again. We advise to reduce sugar/carbohydrate/alcohol, eat more vegetables and lean meats such as fish/chicken/turkey. We also recommend 30 minutes of cardiovascular exercise most days of the week.    Jet Sarkar M.D.

## 2018-07-13 ENCOUNTER — OFFICE VISIT (OUTPATIENT)
Dept: MEDICAL GROUP | Facility: MEDICAL CENTER | Age: 78
End: 2018-07-13
Payer: MEDICARE

## 2018-07-13 VITALS
WEIGHT: 186.2 LBS | TEMPERATURE: 97.5 F | HEIGHT: 67 IN | BODY MASS INDEX: 29.22 KG/M2 | HEART RATE: 97 BPM | OXYGEN SATURATION: 94 % | DIASTOLIC BLOOD PRESSURE: 72 MMHG | SYSTOLIC BLOOD PRESSURE: 118 MMHG

## 2018-07-13 DIAGNOSIS — E78.2 MIXED HYPERLIPIDEMIA: ICD-10-CM

## 2018-07-13 DIAGNOSIS — R05.9 COUGH: ICD-10-CM

## 2018-07-13 DIAGNOSIS — I48.91 ATRIAL FIBRILLATION, UNSPECIFIED TYPE (HCC): ICD-10-CM

## 2018-07-13 PROCEDURE — 99214 OFFICE O/P EST MOD 30 MIN: CPT | Performed by: FAMILY MEDICINE

## 2018-07-13 RX ORDER — METOPROLOL SUCCINATE 50 MG/1
25 TABLET, EXTENDED RELEASE ORAL DAILY
Qty: 135 TAB | Refills: 3 | COMMUNITY
Start: 2018-07-13 | End: 2018-11-30 | Stop reason: SDUPTHER

## 2018-07-13 RX ORDER — FLUTICASONE PROPIONATE 50 MCG
2 SPRAY, SUSPENSION (ML) NASAL DAILY
Qty: 16 G | Refills: 3 | Status: SHIPPED | OUTPATIENT
Start: 2018-07-13 | End: 2019-04-22 | Stop reason: SDUPTHER

## 2018-07-13 NOTE — ASSESSMENT & PLAN NOTE
Patient was taking metoprolol 75 mg daily lisinopril 10 mg daily but states that she was given significant fatigue, drowsiness, lethargy, irritability so she discontinued medication.  Once she discontinued medication her symptoms improved.  She has been off medication for at least the last week.  Patient complains that her heart rate fluctuates between 60 and 170.

## 2018-07-13 NOTE — ASSESSMENT & PLAN NOTE
Patient is complaining of postnasal drainage which is causing a cough for the last 2 weeks.  She also has associated left-sided intercostal rib pain from coughing.  She denies any heartburn or wheezing.  Currently she is not using medication for postnasal drainage.

## 2018-07-13 NOTE — PROGRESS NOTES
Subjective:   Nikhil Solis is a 78 y.o. female here today for A. fib, cough, hyperlipidemia    Atrial fibrillation  Patient was taking metoprolol 75 mg daily lisinopril 10 mg daily but states that she was given significant fatigue, drowsiness, lethargy, irritability so she discontinued medication.  Once she discontinued medication her symptoms improved.  She has been off medication for at least the last week.  Patient complains that her heart rate fluctuates between 60 and 170.    Cough  Patient is complaining of postnasal drainage which is causing a cough for the last 2 weeks.  She also has associated left-sided intercostal rib pain from coughing.  She denies any heartburn or wheezing.  Currently she is not using medication for postnasal drainage.    Mixed hyperlipidemia  Reviewed labs with patient.  Advised her to restart atorvastatin 10 mg daily.  Results for NIKHIL SOLIS (MRN [id number]) as of 7/13/2018 15:28   Ref. Range 7/5/2018 10:03   Cholesterol,Tot Latest Ref Range: 100 - 199 mg/dL 212 (H)   Triglycerides Latest Ref Range: 0 - 149 mg/dL 152 (H)   HDL Latest Ref Range: >=40 mg/dL 40   LDL Latest Ref Range: <100 mg/dL 142 (H)          Current medicines (including changes today)  Current Outpatient Prescriptions   Medication Sig Dispense Refill   • metoprolol SR (TOPROL XL) 50 MG TABLET SR 24 HR Take 0.5 Tabs by mouth every day. 135 Tab 3   • fluticasone (FLONASE) 50 MCG/ACT nasal spray Spray 2 Sprays in nose every day. 16 g 3   • [START ON 8/21/2018] HYDROcodone-acetaminophen (NORCO) 5-325 MG Tab per tablet Take 1 Tab by mouth 2 times a day as needed (severe pain) for up to 30 days. 60 Tab 0   • zolpidem (AMBIEN) 5 MG Tab Take 1 Tab by mouth at bedtime as needed for Sleep for up to 90 days. 90 Tab 1   • aspirin (ASA) 325 MG Tab Take 1 Tab by mouth every day.     • gabapentin (NEURONTIN) 600 MG tablet Take 1 Tab by mouth 2 times a day. 180 Tab 3   • divalproex ER (DEPAKOTE ER) 500 MG  "TABLET SR 24 HR Take 1 Tab by mouth 2 Times a Day. 180 Tab 3   • atorvastatin (LIPITOR) 10 MG Tab Take 1 Tab by mouth every bedtime. 90 Tab 3   • cyclosporin (RESTASIS) 0.05 % ophthalmic emulsion Place 1 Drop in both eyes 2 times a day. 3 Each 3   • furosemide (LASIX) 20 MG Tab Take 1 Tab by mouth every day. 90 Tab 3   • beclomethasone (QVAR) 40 MCG/ACT inhaler Inhale 1 Puff by mouth 2 Times a Day. (Patient taking differently: Inhale 1 Puff by mouth as needed.) 40 Inhaler 3   • Multiple Vitamins-Minerals (OCUVITE ADULT 50+ PO) Take  by mouth every day.       No current facility-administered medications for this visit.      She  has a past medical history of Atrial fibrillation ([facility]) (February 2014); Back pain; CAD (coronary artery disease) (February 2014); Cerebellar stroke, acute ([facility]) (February 2014); Cholesterol blood decreased; Chronic anticoagulation; H/O total knee replacement (1996); Hyperlipemia; Insomnia; Medical home; Migraine without aura, with intractable migraine, so stated, without mention of status migrainosus; Osteoarthritis of more than one site; Postmenopausal; Sleep apnea; and Stroke ([facility]).    ROS   No chest pain, no shortness of breath       Objective:     Blood pressure 118/72, pulse 97, temperature 36.4 °C (97.5 °F), height 1.689 m (5' 6.5\"), weight 84.5 kg (186 lb 3.2 oz), SpO2 94 %. Body mass index is 29.6 kg/m².   Physical Exam:  Constitutional: Alert, no distress.  Skin: Warm, dry, good turgor, no rashes in visible areas.  Eye: Equal, round and reactive, conjunctiva clear, lids normal.  Respiratory: Unlabored respiratory effort, lungs clear to auscultation, no wheezes, no ronchi.  Cardiovascular: Normal S1, S2, no murmur, no edema.  Psych: Alert and oriented x3, normal affect and mood.        Assessment and Plan:   The following treatment plan was discussed    1. Atrial fibrillation, unspecified type ([facility])  Trial of metoprolol SR 50 mg tablets, half tablet daily, hopefully she is able to " tolerate this without lethargy.  Discussed the importance of metoprolol with NAYLA iverson.  Discontinue lisinopril because blood pressure is well controlled.  Notify us in 2 weeks how she is tolerating metoprolol.  If she is tolerating metoprolol well we will send in a prescription for 50 mg tablets, half tablet daily to her mail order [facility] pharmacy.  - metoprolol SR (TOPROL XL) 50 MG TABLET SR 24 HR; Take 0.5 Tabs by mouth every day.  Dispense: 135 Tab; Refill: 3    2. Cough  Most likely from postnasal drainage.  Start Flonase.    3. Mixed hyperlipidemia  Uncontrolled.  Advised patient to restart atorvastatin 10 mg daily.      Followup: Return in about 3 months (around 10/13/2018) for Pain medication refill.

## 2018-07-13 NOTE — ASSESSMENT & PLAN NOTE
Reviewed labs with patient.  Advised her to restart atorvastatin 10 mg daily.  Results for NIKHIL SHAW (MRN [id number]) as of 7/13/2018 15:28   Ref. Range 7/5/2018 10:03   Cholesterol,Tot Latest Ref Range: 100 - 199 mg/dL 212 (H)   Triglycerides Latest Ref Range: 0 - 149 mg/dL 152 (H)   HDL Latest Ref Range: >=40 mg/dL 40   LDL Latest Ref Range: <100 mg/dL 142 (H)

## 2018-09-17 ENCOUNTER — NON-PROVIDER VISIT (OUTPATIENT)
Dept: MEDICAL GROUP | Facility: MEDICAL CENTER | Age: 78
End: 2018-09-17
Payer: MEDICARE

## 2018-09-17 DIAGNOSIS — Z23 NEEDS FLU SHOT: ICD-10-CM

## 2018-09-17 PROCEDURE — 90662 IIV NO PRSV INCREASED AG IM: CPT | Performed by: FAMILY MEDICINE

## 2018-09-17 PROCEDURE — G0008 ADMIN INFLUENZA VIRUS VAC: HCPCS | Performed by: FAMILY MEDICINE

## 2018-10-09 ENCOUNTER — TELEPHONE (OUTPATIENT)
Dept: MEDICAL GROUP | Facility: MEDICAL CENTER | Age: 78
End: 2018-10-09

## 2018-10-09 NOTE — TELEPHONE ENCOUNTER
Tried contacting pt to schedule an AWV for pt, phone number just rang w/o an answer. If pt calls back please transfer call to [phone]
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
## 2018-10-14 DIAGNOSIS — G43.019 INTRACTABLE MIGRAINE WITHOUT AURA AND WITHOUT STATUS MIGRAINOSUS: ICD-10-CM

## 2018-10-15 RX ORDER — DIVALPROEX SODIUM 500 MG/1
TABLET, EXTENDED RELEASE ORAL
Qty: 180 TAB | Refills: 3 | Status: SHIPPED | OUTPATIENT
Start: 2018-10-15 | End: 2019-08-02 | Stop reason: SDUPTHER

## 2018-11-21 ENCOUNTER — TELEPHONE (OUTPATIENT)
Dept: MEDICAL GROUP | Facility: MEDICAL CENTER | Age: 78
End: 2018-11-21

## 2018-11-21 NOTE — TELEPHONE ENCOUNTER
Future Appointments       Provider Department Center    11/30/2018 1:20 PM Jet Sarkar M.D.; [facility]          ANNUAL WELLNESS VISIT PRE-VISIT PLANNING WITHOUT OUTREACH    1.  Reviewed note from last office visit with PCP: YES    2.  If any orders were placed at last visit, do we have Results/Consult Notes?        •  Labs - Labs were not ordered at last office visit.  Note: If patient appointment is for lab review and patient did not complete labs, check with provider if OK to reschedule patient until labs completed.       •  Imaging - Imaging was not ordered at last office visit.       •  Referrals - No referrals were ordered at last office visit.    3.  Immunizations were updated in [facility] using WebIZ?: Yes       •  WebIZ Recommendations: TDAP and SHINGRIX (Shingles)        •  Is patient due for Tdap? YES. Patient was not notified of copay/out of pocket cost.       •  Is patient due for Shingles? YES. Patient was not notified of copay/out of pocket cost.     4.  Patient is due for the following Health Maintenance Topics:   Health Maintenance Due   Topic Date Due   • IMM DTaP/Tdap/Td Vaccine (1 - Tdap) 07[birth year]04/1959   • IMM ZOSTER VACCINES (1 of 2) 07/04/1990   • BONE DENSITY  07/04/2005   • Annual Wellness Visit  08/27/2016       5.  Reviewed/Updated the following with patient:       •   Preferred Pharmacy? NO       •   Preferred Lab? NO       •   Preferred Communication? NO       •   Allergies? NO       •   Medications? NO       •   Social History? NO       •   Family History (document living status of immediate family members and if + hx of  cancer, diabetes, hypertension, hyperlipidemia, heart attack, stroke) NO    6.  Care Team Updated:       •   DME Company (gait device, O2, CPAP, etc.): NO       •   Other Specialists (eye doctor, derm, GYN, cardiology, endo, etc): NO    7.  Patient has the following Care Path diagnoses on Problem List:  NONE    8.   Patient was not advised: “This is a free wellness visit. The provider will screen for medical conditions to help you stay healthy. If you have other concerns to address you may be asked to discuss these at a separate visit or there may be an additional fee.”     9.  Patient was NOT informed to arrive 15 min prior to their scheduled appointment and bring in their medication bottles.

## 2018-11-30 ENCOUNTER — OFFICE VISIT (OUTPATIENT)
Dept: MEDICAL GROUP | Facility: MEDICAL CENTER | Age: 78
End: 2018-11-30
Payer: MEDICARE

## 2018-11-30 VITALS
HEART RATE: 137 BPM | WEIGHT: 190 LBS | SYSTOLIC BLOOD PRESSURE: 116 MMHG | OXYGEN SATURATION: 93 % | HEIGHT: 68 IN | DIASTOLIC BLOOD PRESSURE: 88 MMHG | BODY MASS INDEX: 28.79 KG/M2 | TEMPERATURE: 97.2 F

## 2018-11-30 DIAGNOSIS — I35.8 AORTIC VALVE SCLEROSIS: ICD-10-CM

## 2018-11-30 DIAGNOSIS — M47.817 LUMBOSACRAL SPONDYLOSIS WITHOUT MYELOPATHY: ICD-10-CM

## 2018-11-30 DIAGNOSIS — Z00.00 MEDICARE ANNUAL WELLNESS VISIT, SUBSEQUENT: ICD-10-CM

## 2018-11-30 DIAGNOSIS — I50.22 CHRONIC SYSTOLIC CONGESTIVE HEART FAILURE (HCC): ICD-10-CM

## 2018-11-30 DIAGNOSIS — E03.8 SUBCLINICAL HYPOTHYROIDISM: ICD-10-CM

## 2018-11-30 DIAGNOSIS — I25.10 ATHEROSCLEROSIS OF NATIVE CORONARY ARTERY OF NATIVE HEART WITHOUT ANGINA PECTORIS: ICD-10-CM

## 2018-11-30 DIAGNOSIS — E78.2 MIXED HYPERLIPIDEMIA: ICD-10-CM

## 2018-11-30 DIAGNOSIS — H04.123 CHRONIC DRYNESS OF BOTH EYES: ICD-10-CM

## 2018-11-30 DIAGNOSIS — Z86.73 HISTORY OF EMBOLIC STROKE: ICD-10-CM

## 2018-11-30 DIAGNOSIS — M65.332 TRIGGER MIDDLE FINGER OF LEFT HAND: ICD-10-CM

## 2018-11-30 DIAGNOSIS — G43.019 INTRACTABLE MIGRAINE WITHOUT AURA AND WITHOUT STATUS MIGRAINOSUS: ICD-10-CM

## 2018-11-30 DIAGNOSIS — G47.33 OSA ON CPAP: ICD-10-CM

## 2018-11-30 DIAGNOSIS — G62.9 NEUROPATHY: ICD-10-CM

## 2018-11-30 DIAGNOSIS — H35.30 MACULAR DEGENERATION OF BOTH EYES, UNSPECIFIED TYPE: ICD-10-CM

## 2018-11-30 DIAGNOSIS — F51.01 PRIMARY INSOMNIA: ICD-10-CM

## 2018-11-30 DIAGNOSIS — I34.0 MODERATE MITRAL REGURGITATION: ICD-10-CM

## 2018-11-30 DIAGNOSIS — I48.91 ATRIAL FIBRILLATION, UNSPECIFIED TYPE (HCC): ICD-10-CM

## 2018-11-30 DIAGNOSIS — N18.30 CKD (CHRONIC KIDNEY DISEASE) STAGE 3, GFR 30-59 ML/MIN (HCC): Chronic | ICD-10-CM

## 2018-11-30 DIAGNOSIS — M15.9 PRIMARY OSTEOARTHRITIS INVOLVING MULTIPLE JOINTS: ICD-10-CM

## 2018-11-30 DIAGNOSIS — Z78.0 POSTMENOPAUSAL: ICD-10-CM

## 2018-11-30 PROBLEM — R05.9 COUGH: Status: RESOLVED | Noted: 2018-07-13 | Resolved: 2018-11-30

## 2018-11-30 PROCEDURE — G0439 PPPS, SUBSEQ VISIT: HCPCS | Performed by: FAMILY MEDICINE

## 2018-11-30 RX ORDER — ATORVASTATIN CALCIUM 10 MG/1
10 TABLET, FILM COATED ORAL
Qty: 90 TAB | Refills: 3 | Status: SHIPPED | OUTPATIENT
Start: 2018-11-30 | End: 2019-08-02 | Stop reason: SDUPTHER

## 2018-11-30 RX ORDER — METOPROLOL SUCCINATE 50 MG/1
25 TABLET, EXTENDED RELEASE ORAL 2 TIMES DAILY
Qty: 90 TAB | Refills: 3 | Status: SHIPPED | OUTPATIENT
Start: 2018-11-30 | End: 2019-04-22 | Stop reason: SDUPTHER

## 2018-11-30 ASSESSMENT — ENCOUNTER SYMPTOMS: GENERAL WELL-BEING: GOOD

## 2018-11-30 ASSESSMENT — ACTIVITIES OF DAILY LIVING (ADL)
TRANSPORTATION COMMENTS: PT IS LEGALLY BLIND
BATHING_REQUIRES_ASSISTANCE: 0

## 2018-11-30 ASSESSMENT — PATIENT HEALTH QUESTIONNAIRE - PHQ9: CLINICAL INTERPRETATION OF PHQ2 SCORE: 0

## 2018-11-30 NOTE — PROGRESS NOTES
Chief Complaint   Patient presents with   • Annual Wellness Visit         HPI:  Aria is a 78 y.o. here for Medicare Annual Wellness Visit        Patient Active Problem List    Diagnosis Date Noted   • Chronic anticoagulation 06/19/2014     Priority: High   • Mixed hyperlipidemia 06/18/2014     Priority: High   • Aortic valve sclerosis 02/09/2014     Priority: High   • Atrial fibrillation (HCC) 02/07/2014     Priority: High   • Coronary atherosclerosis of native coronary artery 02/07/2014     Priority: High   • Chronic systolic congestive heart failure (HCC) 05/02/2016     Priority: Medium   • Edema 04/27/2016     Priority: Medium   • CKD (chronic kidney disease) stage 3, GFR 30-59 ml/min (Former[facility]) 10/12/2015     Priority: Medium   • ANDRE on CPAP 08/27/2015     Priority: Medium   • History of embolic stroke 06/18/2014     Priority: Medium   • Moderate mitral regurgitation 02/09/2014     Priority: Medium   • Radicular pain- L1 nerve root impingement 06/27/2012     Priority: Medium   • Subclinical hypothyroidism 10/12/2015     Priority: Low   • Neuropathy (HCC) 07/11/2011     Priority: Low   • Insomnia      Priority: Low   • Osteoarthritis of more than one site      Priority: Low   • Trigger middle finger of left hand 11/30/2018   • Chronically dry eyes 10/12/2015   • Macular degeneration of both eyes 10/12/2015   • Lumbosacral spondylosis without myelopathy 11/18/2014   • Intractable migraine without aura        Current Outpatient Prescriptions   Medication Sig Dispense Refill   • atorvastatin (LIPITOR) 10 MG Tab Take 1 Tab by mouth every bedtime. 90 Tab 3   • divalproex ER (DEPAKOTE ER) 500 MG TABLET SR 24 HR TAKE 1 TABLET TWICE A  Tab 3   • metoprolol SR (TOPROL XL) 50 MG TABLET SR 24 HR Take 0.5 Tabs by mouth every day. 135 Tab 3   • aspirin (ASA) 325 MG Tab Take 1 Tab by mouth every day.     • gabapentin (NEURONTIN) 600 MG tablet Take 1 Tab by mouth 2 times a day. 180 Tab 3   • cyclosporin (RESTASIS) 0.05 %  ophthalmic emulsion Place 1 Drop in both eyes 2 times a day. 3 Each 3   • Multiple Vitamins-Minerals (OCUVITE ADULT 50+ PO) Take  by mouth every day.     • fluticasone (FLONASE) 50 MCG/ACT nasal spray Spray 2 Sprays in nose every day. 16 g 3   • furosemide (LASIX) 20 MG Tab Take 1 Tab by mouth every day. (Patient not taking: Reported on 11/30/2018) 90 Tab 3   • beclomethasone (QVAR) 40 MCG/ACT inhaler Inhale 1 Puff by mouth 2 Times a Day. (Patient not taking: Reported on 11/30/2018) 40 Inhaler 3     No current facility-administered medications for this visit.         Patient is taking medications as noted in medication list.  Current supplements as per medication list.     Allergies: Darvon [propoxyphene hcl] and Gluten meal    Current social contact/activities: Pt reports going traveling in her RV     Is patient current with immunizations? No, due for TDAP and SHINGRIX (Shingles). Patient is interested in receiving NONE today.    She  reports that she quit smoking about 50 years ago. Her smoking use included Cigarettes. She has a 30.00 pack-year smoking history. She has never used smokeless tobacco. She reports that she does not drink alcohol or use drugs.  Counseling given: Not Answered        DPA/Advanced directive: Patient has Advanced Directive on file.     ROS:    Gait: Uses no assistive device   Ostomy: No   Other tubes: No   Amputations: No   Chronic oxygen use No   Last eye exam Last year   Wears hearing aids: No   : Reports urinary leakage during the last 6 months that has not interfered at all with their daily activities or sleep.        Depression Screening    Little interest or pleasure in doing things?  0 - not at all  Feeling down, depressed, or hopeless? 0 - not at all  Patient Health Questionnaire Score: 0    If depressive symptoms identified deferred to follow up visit unless specifically addressed in assessment and plan.    Interpretation of PHQ-9 Total Score   Score Severity   1-4 No Depression    5-9 Mild Depression   10-14 Moderate Depression   15-19 Moderately Severe Depression   20-27 Severe Depression    Screening for Cognitive Impairment    Three Minute Recall (leader, season, table)  2/3    [place] clock face with all 12 numbers and set the hands to show 10 past 11.  Yes 5/5  If cognitive concerns identified, deferred for follow up unless specifically addressed in assessment and plan.    Fall Risk Assessment    Has the patient had two or more falls in the last year or any fall with injury in the last year?  No  If fall risk identified, deferred for follow up unless specifically addressed in assessment and plan.    Safety Assessment    Throw rugs on floor.  Yes  Handrails on all stairs.  No  Good lighting in all hallways.  Yes  Difficulty hearing.  No  Patient counseled about all safety risks that were identified.    Functional Assessment ADLs    Are there any barriers preventing you from cooking for yourself or meeting nutritional needs?  No.    Are there any barriers preventing you from driving safely or obtaining transportation?  Yes. Pt is legally blind  Are there any barriers preventing you from using a telephone or calling for help?  No.    Are there any barriers preventing you from shopping?  No.    Are there any barriers preventing you from taking care of your own finances?  No.    Are there any barriers preventing you from managing your medications?  No.    Are there any barriers preventing you from showering, bathing or dressing yourself?  No.    Are you currently engaging in any exercise or physical activity?  No.     What is your perception of your health?  Good.    Health Maintenance Summary                IMM DTaP/Tdap/Td Vaccine Overdue 7/4/1959     IMM ZOSTER VACCINES Overdue 7/4/1990     BONE DENSITY Overdue 7/4/2005     Annual Wellness Visit Overdue 8/27/2016      Done 8/27/2015           Patient Care Team:  Jet Sarkar M.D. as PCP - General (Family Medicine)  Juan Fay M.D. as  "Consulting Physician (Phys Med and Rehab)  Mario Go M.D. as Consulting Physician (Neurology)  [facility]    Social History   Substance Use Topics   • Smoking status: Former Smoker     Packs/day: 3.00     Years: 10.00     Types: Cigarettes     Quit date: 9/22/1968   • Smokeless tobacco: Never Used   • Alcohol use No     Family History   Problem Relation Age of Onset   • Other Father         trauma   • Heart Disease Neg Hx    • Heart Attack Neg Hx    • Heart Failure Neg Hx    • Hyperlipidemia Neg Hx      She  has a past medical history of Atrial fibrillation (HCC) (February 2014); Back pain; CAD (coronary artery disease) (February 2014); Cerebellar stroke, acute (HCC) (February 2014); Cholesterol blood decreased; Chronic anticoagulation; H/O total knee replacement (1996); Hyperlipemia; Insomnia; Medical home; Migraine without aura, with intractable migraine, so stated, without mention of status migrainosus; Osteoarthritis of more than one site; Postmenopausal; Sleep apnea; and Stroke (HCC).   Past Surgical History:   Procedure Laterality Date   • NEURO DEST FACET L/S W/IG SNGL  11/18/2014    Performed by Nelly Lomeli at [facility]   • NEURO DEST FACET L/S W/IG ADDL  11/18/2014    Performed by Nelly Lomeli at [facility]   • NEURO DEST FACET L/S W/IG ADDL  11/18/2014    Performed by Nelly Lomeli at [facility]   • OTHER ORTHOPEDIC SURGERY  February 2012    Spinal stimulator   • OTHER ORTHOPEDIC SURGERY  2004    Right ankle surgery   • KNEE REPLACEMENT, TOTAL  Feb/April 1996    Bilateral   • LUMBAR LAMINECTOMY DISKECTOMY  1975/1984    L5 and L6 laminectomy           Exam:     Blood pressure 116/88, pulse (!) 137, temperature 36.2 °C (97.2 °F), temperature source Temporal, height 1.727 m (5' 8\"), weight 86.2 kg (190 lb), SpO2 93 %, not currently breastfeeding. Body mass index is 28.89 kg/m².    Constitutional: Alert, no distress.  Skin: Warm, dry, " good turgor, no rashes in visible areas.  Eye: Equal, round and reactive, conjunctiva clear, lids normal.  Cardiovascular: Irregular rhythm and rate with tachycardia  Psych: Alert and oriented x3, normal affect and mood.      Assessment and Plan. The following treatment and monitoring plan is recommended:      1. Medicare annual wellness visit, subsequent  Advised healthy lifestyle.    2. Atrial fibrillation, unspecified type (Former[facility])  Patient is having rapid A. fib.  Discussed risk of A. fib.  She is asymptomatic.  Patient declines anticoagulation except aspirin 325 mg daily, she discontinued Coumadin.  Offered cardiology referral but she declines.  Advised her to increase her metoprolol from 25 mg daily to 25 mg twice daily.  - TSH WITH REFLEX TO FT4; Future  - CBC WITH DIFFERENTIAL; Future    3. Atherosclerosis of native coronary artery of native heart without angina pectoris  Stable.  Advised patient to restart atorvastatin.    4. CKD (chronic kidney disease) stage 3, GFR 30-59 ml/min ([facility])  Stable.  Check labs in 6 months in follow-up.  - COMP METABOLIC PANEL; Future  - TSH WITH REFLEX TO FT4; Future  - CBC WITH DIFFERENTIAL; Future    5. Chronic systolic congestive heart failure (HCC)  Asymptomatic.  Continue to monitor.    6. Neuropathy ([facility])  Controlled.  Continue gabapentin.    7. Aortic valve sclerosis  Stable.  Continue to monitor.    8. Mixed hyperlipidemia  Advised patient to restart atorvastatin.  - atorvastatin (LIPITOR) 10 MG Tab; Take 1 Tab by mouth every bedtime.  Dispense: 90 Tab; Refill: 3  - COMP METABOLIC PANEL; Future  - Lipid Profile; Future    9. Moderate mitral regurgitation  Stable.  Continue to monitor.    10. History of embolic stroke      11. ANDRE on CPAP  Advised patient to restart CPAP machine.    12. Subclinical hypothyroidism  Follow-up with TSH in 6 months.    13. Macular degeneration of both eyes, unspecified type  Progressively getting more severe.  Patient is going to   [facility].    14. Chronic dryness of both eyes  Stable.  Patient is on Restasis.  Continue Restasis.    15. Primary osteoarthritis involving multiple joints  Stable.  Continue to monitor.    16. Lumbosacral spondylosis without myelopathy  Stable.  Continue to monitor.    17. Primary insomnia  Controlled.  Continue Ambien.    18. Intractable migraine without aura and without status migrainosus  Controlled.  Continue Depakote.    19. Trigger middle finger of left hand  Advised patient to monitor and do hand exercises.  If no improvement consider hand referral.    20. Postmenopausal  DEXA ordered.  Call with results.  - DS-BONE DENSITY STUDY (DEXA); Future        Services suggested: No services needed at this time  Health Care Screening recommendations as per orders if indicated.  Referrals offered: PT/OT/Nutrition counseling/Behavioral Health/Smoking cessation as per orders if indicated.    Discussion today about general wellness and lifestyle habits:    · Prevent falls and reduce trip hazards; Cautioned about securing or removing rugs.  · Have a working fire alarm and carbon monoxide detector;   · Engage in regular physical activity and social activities       Follow-up: Return in about 6 months (around 5/30/2019) for A fib.

## 2019-02-11 RX ORDER — LISINOPRIL 2.5 MG/1
TABLET ORAL
Qty: 90 TAB | Refills: 3 | Status: SHIPPED | OUTPATIENT
Start: 2019-02-11 | End: 2020-02-06

## 2019-04-22 ENCOUNTER — OFFICE VISIT (OUTPATIENT)
Dept: MEDICAL GROUP | Facility: MEDICAL CENTER | Age: 79
End: 2019-04-22
Payer: MEDICARE

## 2019-04-22 VITALS
HEART RATE: 71 BPM | DIASTOLIC BLOOD PRESSURE: 72 MMHG | BODY MASS INDEX: 28.04 KG/M2 | OXYGEN SATURATION: 98 % | SYSTOLIC BLOOD PRESSURE: 116 MMHG | TEMPERATURE: 97 F | HEIGHT: 68 IN | WEIGHT: 185 LBS

## 2019-04-22 DIAGNOSIS — I48.91 ATRIAL FIBRILLATION, UNSPECIFIED TYPE (HCC): ICD-10-CM

## 2019-04-22 DIAGNOSIS — R73.01 ELEVATED FASTING BLOOD SUGAR: ICD-10-CM

## 2019-04-22 DIAGNOSIS — R05.9 COUGH: ICD-10-CM

## 2019-04-22 DIAGNOSIS — F51.01 PRIMARY INSOMNIA: ICD-10-CM

## 2019-04-22 DIAGNOSIS — E78.2 MIXED HYPERLIPIDEMIA: ICD-10-CM

## 2019-04-22 DIAGNOSIS — M47.817 LUMBOSACRAL SPONDYLOSIS WITHOUT MYELOPATHY: ICD-10-CM

## 2019-04-22 DIAGNOSIS — N18.30 CKD (CHRONIC KIDNEY DISEASE) STAGE 3, GFR 30-59 ML/MIN (HCC): Chronic | ICD-10-CM

## 2019-04-22 DIAGNOSIS — Z78.0 ASYMPTOMATIC MENOPAUSAL STATE: ICD-10-CM

## 2019-04-22 PROCEDURE — 99214 OFFICE O/P EST MOD 30 MIN: CPT | Performed by: FAMILY MEDICINE

## 2019-04-22 RX ORDER — FLUTICASONE PROPIONATE 50 MCG
2 SPRAY, SUSPENSION (ML) NASAL DAILY
Qty: 16 G | Refills: 3 | Status: SHIPPED | OUTPATIENT
Start: 2019-04-22

## 2019-04-22 RX ORDER — PREDNISONE 20 MG/1
40 TABLET ORAL DAILY
Qty: 10 TAB | Refills: 0 | Status: SHIPPED | OUTPATIENT
Start: 2019-04-22 | End: 2019-04-27

## 2019-04-22 RX ORDER — ALBUTEROL SULFATE 90 UG/1
2 AEROSOL, METERED RESPIRATORY (INHALATION) EVERY 6 HOURS PRN
Qty: 8.5 G | Refills: 3 | Status: SHIPPED | OUTPATIENT
Start: 2019-04-22 | End: 2020-05-12

## 2019-04-22 RX ORDER — ZOLPIDEM TARTRATE 5 MG/1
5 TABLET ORAL NIGHTLY PRN
Qty: 90 TAB | Refills: 1 | Status: SHIPPED
Start: 2019-04-22 | End: 2019-07-21

## 2019-04-22 RX ORDER — METOPROLOL SUCCINATE 100 MG/1
100 TABLET, EXTENDED RELEASE ORAL DAILY
Qty: 90 TAB | Refills: 3 | Status: SHIPPED | OUTPATIENT
Start: 2019-04-22 | End: 2019-05-07 | Stop reason: SDUPTHER

## 2019-04-22 RX ORDER — DOXYCYCLINE HYCLATE 100 MG
100 TABLET ORAL 2 TIMES DAILY
Qty: 14 TAB | Refills: 0 | Status: SHIPPED | OUTPATIENT
Start: 2019-04-22 | End: 2019-04-29

## 2019-04-22 RX ORDER — HYDROCODONE BITARTRATE AND ACETAMINOPHEN 5; 325 MG/1; MG/1
1 TABLET ORAL 2 TIMES DAILY PRN
Qty: 60 TAB | Refills: 0 | Status: SHIPPED | OUTPATIENT
Start: 2019-04-22 | End: 2019-05-02

## 2019-04-22 ASSESSMENT — PATIENT HEALTH QUESTIONNAIRE - PHQ9: CLINICAL INTERPRETATION OF PHQ2 SCORE: 0

## 2019-04-22 NOTE — PROGRESS NOTES
Subjective:   Aria Solis is a 78 y.o. female here today for cough, back pain, insomnia    Cough  For the last 2 weeks has been having cough with green mucus, her chest hurts, fatigued, not eating well, headache, + wheezing. No seasonal allergies. + nasal drainage. No GERD.  She states that symptoms have improved over the last couple weeks.  She has been trying cough drops and cold medication, which do help.    Atrial fibrillation  Has been noticing increased fatigue with exertion.  She is currently on metoprolol SR 50 mg daily.    Lumbosacral spondylosis without myelopathy  Chronic back pain, cannot cook food without having significant back pain.  MRI 2012:  1. Mild degenerative changes in the lumbar spine as described above.  2. There is left foraminal disk extrusion at L1-2.  The extruded disk fragment migrated upwards into the left L1 neural foramina.  There is possible impingement of the exiting left L1 nerve root at the neural foramina.  3. There is a right sided facet joint arthropathy at L4-5 causing moderate stenosis of the right lateral recess.  There is also possible impingement of the exiting right L5 nerve root at the lateral recess.  4. There is degenerative rightward translation of L2 on L3.  There is also mild anterior subluxation of L2 on 3 and L4 on 5.    Current pain control: poor, 10/10 without pain medication. With Vicodin 3/10  Adverse effects: none  Physical functioning: good  Mood: good  Family and social relationships: good  Sleep pattern: good  Overall functioning: good  Nonnarcotic treatments that are being used: spinal stimulator, tylenol, ibuprofen, gabapentin, zanaflex, ice, heat, ablation, epidurals.    Pain management agreement initiated and signed on: March 2018  Last dose of narcotic medication: 2 weeks ago  Most recent urine drug screen done March 2018, which was reviewed today and was positive for alcohol, but denies drinking.  Aberrant Behaviors: None  I have  reviewed the medical records, the Prescription Monitoring Program and I have determined that Norco 5 mg twice daily is medically indicated.    I have advised patient to keep medication in a safe place and to not drive with medication.    CKD (chronic kidney disease) stage 3, GFR 30-59 ml/min  GFR has been in the mid to upper 40s.  Last GFR was about 9 months ago.    Insomnia  Patient is requesting a refill for Ambien 5 mg.  She denies any side effects with Ambien.    Mixed hyperlipidemia  Patient is tolerating atorvastatin 10 mg daily.         Current medicines (including changes today)  Current Outpatient Prescriptions   Medication Sig Dispense Refill   • metoprolol SR (TOPROL XL) 100 MG TABLET SR 24 HR Take 1 Tab by mouth every day. 90 Tab 3   • zolpidem (AMBIEN) 5 MG Tab Take 1 Tab by mouth at bedtime as needed for Sleep for up to 90 days. 90 Tab 1   • HYDROcodone-acetaminophen (NORCO) 5-325 MG Tab per tablet Take 1 Tab by mouth 2 times a day as needed (severe pain) for up to 30 days. 60 Tab 0   • doxycycline (VIBRAMYCIN) 100 MG Tab Take 1 Tab by mouth 2 times a day for 7 days. 14 Tab 0   • predniSONE (DELTASONE) 20 MG Tab Take 2 Tabs by mouth every day for 5 days. 10 Tab 0   • albuterol (PROAIR HFA) 108 (90 Base) MCG/ACT Aero Soln inhalation aerosol Inhale 2 Puffs by mouth every 6 hours as needed for Shortness of Breath. 8.5 g 3   • fluticasone (FLONASE) 50 MCG/ACT nasal spray Spray 2 Sprays in nose every day. 16 g 3   • lisinopril (PRINIVIL) 2.5 MG Tab TAKE 1 TABLET DAILY 90 Tab 3   • atorvastatin (LIPITOR) 10 MG Tab Take 1 Tab by mouth every bedtime. 90 Tab 3   • divalproex ER (DEPAKOTE ER) 500 MG TABLET SR 24 HR TAKE 1 TABLET TWICE A  Tab 3   • aspirin (ASA) 325 MG Tab Take 1 Tab by mouth every day.     • gabapentin (NEURONTIN) 600 MG tablet Take 1 Tab by mouth 2 times a day. 180 Tab 3   • cyclosporin (RESTASIS) 0.05 % ophthalmic emulsion Place 1 Drop in both eyes 2 times a day. 3 Each 3   • furosemide  "(LASIX) 20 MG Tab Take 1 Tab by mouth every day. (Patient not taking: Reported on 11/30/2018) 90 Tab 3   • Multiple Vitamins-Minerals (OCUVITE ADULT 50+ PO) Take  by mouth every day.       No current facility-administered medications for this visit.      She  has a past medical history of Atrial fibrillation (HCC) (February 2014); Back pain; CAD (coronary artery disease) (February 2014); Cerebellar stroke, acute (HCC) (February 2014); Cholesterol blood decreased; Chronic anticoagulation; H/O total knee replacement (1996); Hyperlipemia; Insomnia; Medical home; Migraine without aura, with intractable migraine, so stated, without mention of status migrainosus; Osteoarthritis of more than one site; Postmenopausal; Sleep apnea; and Stroke (Former[facility]).    ROS   See HPI       Objective:     /72 (BP Location: Right arm, Patient Position: Sitting)   Pulse 71   Temp 36.1 °C (97 °F)   Ht 1.727 m (5' 8\")   Wt 83.9 kg (185 lb)   SpO2 98%  Body mass index is 28.13 kg/m².   Physical Exam:  Constitutional: Alert, no distress.  Skin: Warm, dry, good turgor, no rashes in visible areas.  Eye: Equal, round and reactive, conjunctiva clear, lids normal.  ENMT: Lips without lesions, good dentition, oropharynx clear.  Positive thick nasal drainage bilaterally.  Neck: Trachea midline, no masses, no thyromegaly. No cervical or supraclavicular lymphadenopathy  Respiratory: Unlabored respiratory effort, mild crackles in bilateral lower lobes, positive mild wheezing in bilateral lungs.  Cardiovascular: Irregular rhythm and rate, slightly fast  Psych: Alert and oriented x3, normal affect and mood.        Assessment and Plan:   The following treatment plan was discussed    1. Cough  Possibly postnasal drainage versus reactive airway.  Prescription for prednisone, albuterol, Flonase, doxycycline.  If no improvement advised patient to follow-up for reevaluation.  - doxycycline (VIBRAMYCIN) 100 MG Tab; Take 1 Tab by mouth 2 times a day for 7 " days.  Dispense: 14 Tab; Refill: 0  - predniSONE (DELTASONE) 20 MG Tab; Take 2 Tabs by mouth every day for 5 days.  Dispense: 10 Tab; Refill: 0  - albuterol (PROAIR HFA) 108 (90 Base) MCG/ACT Aero Soln inhalation aerosol; Inhale 2 Puffs by mouth every 6 hours as needed for Shortness of Breath.  Dispense: 8.5 g; Refill: 3  - fluticasone (FLONASE) 50 MCG/ACT nasal spray; Spray 2 Sprays in nose every day.  Dispense: 16 g; Refill: 3    2. Asymptomatic menopausal state  Check DEXA and call with results.  - DS-BONE DENSITY STUDY (DEXA); Future    3. CKD (chronic kidney disease) stage 3, GFR 30-59 ml/min ([facility])  Check labs and call with results.  - CBC WITH DIFFERENTIAL; Future  - Comp Metabolic Panel; Future    4. Mixed hyperlipidemia  Controlled.  Continue atorvastatin.  Check labs and call with results.  - Comp Metabolic Panel; Future  - TSH WITH REFLEX TO FT4; Future  - Lipid Profile; Future    5. Atrial fibrillation, unspecified type ([facility])  Advised patient to increase metoprolol SR 50 mg daily to 100 mg daily.  - CBC WITH DIFFERENTIAL; Future  - TSH WITH REFLEX TO FT4; Future  - metoprolol SR (TOPROL XL) 100 MG TABLET SR 24 HR; Take 1 Tab by mouth every day.  Dispense: 90 Tab; Refill: 3    6. Elevated fasting blood sugar  Check labs and call with results.  - HEMOGLOBIN A1C; Future    7. Primary insomnia  Stable.  Refill Ambien.  - zolpidem (AMBIEN) 5 MG Tab; Take 1 Tab by mouth at bedtime as needed for Sleep for up to 90 days.  Dispense: 90 Tab; Refill: 1    8. Lumbosacral spondylosis without myelopathy  Infrequent use of low-dose Norco.  Refill done today.  UDS and consent signed today.  Follow-up when needing additional refills.  - HYDROcodone-acetaminophen (NORCO) 5-325 MG Tab per tablet; Take 1 Tab by mouth 2 times a day as needed (severe pain) for up to 30 days.  Dispense: 60 Tab; Refill: 0  - Consent for Opiate Prescription  - PAIN MANAGEMENT SCRN, UR; Future      Followup: Return if symptoms worsen or fail to  improve.

## 2019-04-22 NOTE — ASSESSMENT & PLAN NOTE
Chronic back pain, cannot cook food without having significant back pain.  MRI 2012:  1. Mild degenerative changes in the lumbar spine as described above.  2. There is left foraminal disk extrusion at L1-2.  The extruded disk fragment migrated upwards into the left L1 neural foramina.  There is possible impingement of the exiting left L1 nerve root at the neural foramina.  3. There is a right sided facet joint arthropathy at L4-5 causing moderate stenosis of the right lateral recess.  There is also possible impingement of the exiting right L5 nerve root at the lateral recess.  4. There is degenerative rightward translation of L2 on L3.  There is also mild anterior subluxation of L2 on 3 and L4 on 5.    Current pain control: poor, 10/10 without pain medication. With Vicodin 3/10  Adverse effects: none  Physical functioning: good  Mood: good  Family and social relationships: good  Sleep pattern: good  Overall functioning: good  Nonnarcotic treatments that are being used: spinal stimulator, tylenol, ibuprofen, gabapentin, zanaflex, ice, heat, ablation, epidurals.    Pain management agreement initiated and signed on: March 2018  Last dose of narcotic medication: 2 weeks ago  Most recent urine drug screen done March 2018, which was reviewed today and was positive for alcohol, but denies drinking.  Aberrant Behaviors: None  I have reviewed the medical records, the Prescription Monitoring Program and I have determined that Norco 5 mg twice daily is medically indicated.    I have advised patient to keep medication in a safe place and to not drive with medication.

## 2019-04-22 NOTE — ASSESSMENT & PLAN NOTE
Has been noticing increased fatigue with exertion.  She is currently on metoprolol SR 50 mg daily.

## 2019-04-22 NOTE — ASSESSMENT & PLAN NOTE
For the last 2 weeks has been having cough with green mucus, her chest hurts, fatigued, not eating well, headache, + wheezing. No seasonal allergies. + nasal drainage. No GERD.  She states that symptoms have improved over the last couple weeks.  She has been trying cough drops and cold medication, which do help.

## 2019-04-29 ENCOUNTER — HOSPITAL ENCOUNTER (OUTPATIENT)
Dept: LAB | Facility: MEDICAL CENTER | Age: 79
End: 2019-04-29
Attending: FAMILY MEDICINE
Payer: MEDICARE

## 2019-04-29 DIAGNOSIS — R73.01 ELEVATED FASTING BLOOD SUGAR: ICD-10-CM

## 2019-04-29 DIAGNOSIS — N18.30 CKD (CHRONIC KIDNEY DISEASE) STAGE 3, GFR 30-59 ML/MIN (HCC): Chronic | ICD-10-CM

## 2019-04-29 DIAGNOSIS — I48.91 ATRIAL FIBRILLATION, UNSPECIFIED TYPE (HCC): ICD-10-CM

## 2019-04-29 DIAGNOSIS — E78.2 MIXED HYPERLIPIDEMIA: ICD-10-CM

## 2019-04-29 LAB
ALBUMIN SERPL BCP-MCNC: 3.5 G/DL (ref 3.2–4.9)
ALBUMIN/GLOB SERPL: 1.2 G/DL
ALP SERPL-CCNC: 57 U/L (ref 30–99)
ALT SERPL-CCNC: 6 U/L (ref 2–50)
ANION GAP SERPL CALC-SCNC: 8 MMOL/L (ref 0–11.9)
AST SERPL-CCNC: 12 U/L (ref 12–45)
BASOPHILS # BLD AUTO: 0.3 % (ref 0–1.8)
BASOPHILS # BLD: 0.03 K/UL (ref 0–0.12)
BILIRUB SERPL-MCNC: 1.3 MG/DL (ref 0.1–1.5)
BUN SERPL-MCNC: 24 MG/DL (ref 8–22)
CALCIUM SERPL-MCNC: 9.5 MG/DL (ref 8.5–10.5)
CHLORIDE SERPL-SCNC: 101 MMOL/L (ref 96–112)
CHOLEST SERPL-MCNC: 137 MG/DL (ref 100–199)
CO2 SERPL-SCNC: 29 MMOL/L (ref 20–33)
CREAT SERPL-MCNC: 1.02 MG/DL (ref 0.5–1.4)
EOSINOPHIL # BLD AUTO: 0.03 K/UL (ref 0–0.51)
EOSINOPHIL NFR BLD: 0.3 % (ref 0–6.9)
ERYTHROCYTE [DISTWIDTH] IN BLOOD BY AUTOMATED COUNT: 52.4 FL (ref 35.9–50)
EST. AVERAGE GLUCOSE BLD GHB EST-MCNC: 148 MG/DL
FASTING STATUS PATIENT QL REPORTED: NORMAL
GLOBULIN SER CALC-MCNC: 2.9 G/DL (ref 1.9–3.5)
GLUCOSE SERPL-MCNC: 139 MG/DL (ref 65–99)
HBA1C MFR BLD: 6.8 % (ref 0–5.6)
HCT VFR BLD AUTO: 48.5 % (ref 37–47)
HDLC SERPL-MCNC: 41 MG/DL
HGB BLD-MCNC: 16.1 G/DL (ref 12–16)
IMM GRANULOCYTES # BLD AUTO: 0.08 K/UL (ref 0–0.11)
IMM GRANULOCYTES NFR BLD AUTO: 0.9 % (ref 0–0.9)
LDLC SERPL CALC-MCNC: 57 MG/DL
LYMPHOCYTES # BLD AUTO: 4.06 K/UL (ref 1–4.8)
LYMPHOCYTES NFR BLD: 45 % (ref 22–41)
MCH RBC QN AUTO: 34.2 PG (ref 27–33)
MCHC RBC AUTO-ENTMCNC: 33.2 G/DL (ref 33.6–35)
MCV RBC AUTO: 103 FL (ref 81.4–97.8)
MONOCYTES # BLD AUTO: 0.93 K/UL (ref 0–0.85)
MONOCYTES NFR BLD AUTO: 10.3 % (ref 0–13.4)
NEUTROPHILS # BLD AUTO: 3.9 K/UL (ref 2–7.15)
NEUTROPHILS NFR BLD: 43.2 % (ref 44–72)
NRBC # BLD AUTO: 0.02 K/UL
NRBC BLD-RTO: 0.2 /100 WBC
PLATELET # BLD AUTO: 204 K/UL (ref 164–446)
PMV BLD AUTO: 11.6 FL (ref 9–12.9)
POTASSIUM SERPL-SCNC: 4.3 MMOL/L (ref 3.6–5.5)
PROT SERPL-MCNC: 6.4 G/DL (ref 6–8.2)
RBC # BLD AUTO: 4.71 M/UL (ref 4.2–5.4)
SODIUM SERPL-SCNC: 138 MMOL/L (ref 135–145)
TRIGL SERPL-MCNC: 196 MG/DL (ref 0–149)
TSH SERPL DL<=0.005 MIU/L-ACNC: 10.8 UIU/ML (ref 0.38–5.33)
WBC # BLD AUTO: 9 K/UL (ref 4.8–10.8)

## 2019-04-29 PROCEDURE — 80053 COMPREHEN METABOLIC PANEL: CPT

## 2019-04-29 PROCEDURE — 83036 HEMOGLOBIN GLYCOSYLATED A1C: CPT

## 2019-04-29 PROCEDURE — 36415 COLL VENOUS BLD VENIPUNCTURE: CPT

## 2019-04-29 PROCEDURE — 80061 LIPID PANEL: CPT

## 2019-04-29 PROCEDURE — 84439 ASSAY OF FREE THYROXINE: CPT

## 2019-04-29 PROCEDURE — 84443 ASSAY THYROID STIM HORMONE: CPT

## 2019-04-29 PROCEDURE — 85025 COMPLETE CBC W/AUTO DIFF WBC: CPT

## 2019-04-30 ENCOUNTER — TELEPHONE (OUTPATIENT)
Dept: MEDICAL GROUP | Facility: MEDICAL CENTER | Age: 79
End: 2019-04-30

## 2019-04-30 LAB — T4 FREE SERPL-MCNC: 0.93 NG/DL (ref 0.53–1.43)

## 2019-04-30 NOTE — TELEPHONE ENCOUNTER
----- Message from Jet Sarkar M.D. sent at 4/30/2019  7:24 AM PDT -----  Please have patient schedule appointment to discuss recent lab results.  Her average blood sugar is now in the diabetic range.  Jet Sarkar MD

## 2019-05-02 ENCOUNTER — OFFICE VISIT (OUTPATIENT)
Dept: MEDICAL GROUP | Facility: MEDICAL CENTER | Age: 79
End: 2019-05-02
Payer: MEDICARE

## 2019-05-02 VITALS
OXYGEN SATURATION: 90 % | WEIGHT: 185 LBS | HEART RATE: 147 BPM | BODY MASS INDEX: 28.04 KG/M2 | DIASTOLIC BLOOD PRESSURE: 68 MMHG | SYSTOLIC BLOOD PRESSURE: 104 MMHG | HEIGHT: 68 IN | TEMPERATURE: 96.6 F

## 2019-05-02 DIAGNOSIS — R73.01 ELEVATED FASTING BLOOD SUGAR: ICD-10-CM

## 2019-05-02 DIAGNOSIS — M47.817 LUMBOSACRAL SPONDYLOSIS WITHOUT MYELOPATHY: ICD-10-CM

## 2019-05-02 DIAGNOSIS — I48.91 ATRIAL FIBRILLATION, UNSPECIFIED TYPE (HCC): ICD-10-CM

## 2019-05-02 PROCEDURE — 99214 OFFICE O/P EST MOD 30 MIN: CPT | Performed by: FAMILY MEDICINE

## 2019-05-02 NOTE — PROGRESS NOTES
Subjective:   Aria Solis is a 78 y.o. female here today for a. Fib and elevated blood sugar.    Patient is here to discuss recent lab results.  However upon checking her in her pulse was 140, oxygen saturation was in the mid 80s which finally improved to 90% with deep breathing, her blood pressure is lower than normal.  Patient had a syncopal event in our clinic a couple weeks ago when she was at her 's appointment, she refused ER visit at that time.  We did increase metoprolol SR from 50 mg to 100 mg, but she has not started to 100 mg dose. Has not seen cardiology since 2017.    Lab results show an A1c of 6.8, last A1c was in 2014 and it was 5.4. Does eat a lot of carbohydrates like potato chips and drinks soda and sweet tea.    Patient was unable to get her Norco prescription because cost was too high.  She states she is unable to cook dinner because of pain and has to sit frequently.    Current medicines (including changes today)  Current Outpatient Prescriptions   Medication Sig Dispense Refill   • metoprolol SR (TOPROL XL) 100 MG TABLET SR 24 HR Take 1 Tab by mouth every day. 90 Tab 3   • zolpidem (AMBIEN) 5 MG Tab Take 1 Tab by mouth at bedtime as needed for Sleep for up to 90 days. 90 Tab 1   • albuterol (PROAIR HFA) 108 (90 Base) MCG/ACT Aero Soln inhalation aerosol Inhale 2 Puffs by mouth every 6 hours as needed for Shortness of Breath. 8.5 g 3   • fluticasone (FLONASE) 50 MCG/ACT nasal spray Spray 2 Sprays in nose every day. 16 g 3   • lisinopril (PRINIVIL) 2.5 MG Tab TAKE 1 TABLET DAILY 90 Tab 3   • atorvastatin (LIPITOR) 10 MG Tab Take 1 Tab by mouth every bedtime. 90 Tab 3   • divalproex ER (DEPAKOTE ER) 500 MG TABLET SR 24 HR TAKE 1 TABLET TWICE A  Tab 3   • aspirin (ASA) 325 MG Tab Take 1 Tab by mouth every day.     • gabapentin (NEURONTIN) 600 MG tablet Take 1 Tab by mouth 2 times a day. 180 Tab 3   • cyclosporin (RESTASIS) 0.05 % ophthalmic emulsion Place 1 Drop in both  "eyes 2 times a day. 3 Each 3   • furosemide (LASIX) 20 MG Tab Take 1 Tab by mouth every day. (Patient not taking: Reported on 11/30/2018) 90 Tab 3   • Multiple Vitamins-Minerals (OCUVITE ADULT 50+ PO) Take  by mouth every day.       No current facility-administered medications for this visit.      She  has a past medical history of Atrial fibrillation (HCC) (February 2014); Back pain; CAD (coronary artery disease) (February 2014); Cerebellar stroke, acute (HCC) (February 2014); Cholesterol blood decreased; Chronic anticoagulation; H/O total knee replacement (1996); Hyperlipemia; Insomnia; Medical home; Migraine without aura, with intractable migraine, so stated, without mention of status migrainosus; Osteoarthritis of more than one site; Postmenopausal; Sleep apnea; and Stroke ([facility]).    ROS   + Mild fatigue, + mild shortness of breath.       Objective:     /68 (BP Location: Right arm, Patient Position: Sitting)   Pulse (!) 147   Temp 35.9 °C (96.6 °F)   Ht 1.727 m (5' 8\")   Wt 83.9 kg (185 lb)   SpO2 90%  Body mass index is 28.13 kg/m².   Physical Exam:  Constitutional: Alert, no distress.  Skin: Warm, dry, good turgor, no rashes in visible areas.  Eye: Equal, round and reactive, conjunctiva clear, lids normal.  Cardiovascular: Rapid irregular heartbeat.  Psych: Alert and oriented x3, normal affect and mood.    EKG: A. fib and rate of around 125 without obvious RVR.    Assessment and Plan:   The following treatment plan was discussed    1. Atrial fibrillation, unspecified type (HCC)  Advised patient to go home and double her metoprolol and referral to cardiology done.  ER precautions given.  - REFERRAL TO CARDIOLOGY    2. Lumbosacral spondylosis without myelopathy  Patient is unable to afford Norco.  Advised Tylenol 1000 mg up to 3 times daily.    3. Elevated fasting blood sugar  Advised patient to reduce soda and sweet tea intake.  Follow-up in 3 months for in clinic A1c.      Followup: Return in about 3 " months (around 8/2/2019) for Diabetes.

## 2019-05-07 ENCOUNTER — HOSPITAL ENCOUNTER (OUTPATIENT)
Dept: CARDIOLOGY | Facility: MEDICAL CENTER | Age: 79
End: 2019-05-07
Attending: INTERNAL MEDICINE
Payer: MEDICARE

## 2019-05-07 ENCOUNTER — OFFICE VISIT (OUTPATIENT)
Dept: CARDIOLOGY | Facility: MEDICAL CENTER | Age: 79
End: 2019-05-07
Payer: MEDICARE

## 2019-05-07 VITALS
SYSTOLIC BLOOD PRESSURE: 116 MMHG | WEIGHT: 186 LBS | OXYGEN SATURATION: 91 % | DIASTOLIC BLOOD PRESSURE: 80 MMHG | BODY MASS INDEX: 28.19 KG/M2 | HEART RATE: 144 BPM | HEIGHT: 68 IN

## 2019-05-07 DIAGNOSIS — I48.20 CHRONIC ATRIAL FIBRILLATION (HCC): ICD-10-CM

## 2019-05-07 DIAGNOSIS — G47.33 OSA ON CPAP: ICD-10-CM

## 2019-05-07 DIAGNOSIS — I34.0 MODERATE MITRAL REGURGITATION: ICD-10-CM

## 2019-05-07 DIAGNOSIS — R25.1 TREMOR: ICD-10-CM

## 2019-05-07 DIAGNOSIS — E78.2 MIXED HYPERLIPIDEMIA: ICD-10-CM

## 2019-05-07 DIAGNOSIS — Z86.73 HISTORY OF EMBOLIC STROKE: ICD-10-CM

## 2019-05-07 DIAGNOSIS — I63.40 CEREBROVASCULAR ACCIDENT (CVA) DUE TO EMBOLISM OF CEREBRAL ARTERY (HCC): ICD-10-CM

## 2019-05-07 DIAGNOSIS — I48.91 ATRIAL FIBRILLATION, UNSPECIFIED TYPE (HCC): ICD-10-CM

## 2019-05-07 DIAGNOSIS — I50.22 CHRONIC SYSTOLIC CONGESTIVE HEART FAILURE (HCC): ICD-10-CM

## 2019-05-07 DIAGNOSIS — N18.30 CKD (CHRONIC KIDNEY DISEASE) STAGE 3, GFR 30-59 ML/MIN (HCC): Chronic | ICD-10-CM

## 2019-05-07 LAB
EKG IMPRESSION: NORMAL
LV EJECT FRACT  99904: 35
LV EJECT FRACT MOD 2C 99903: 66.32
LV EJECT FRACT MOD 4C 99902: 46.99
LV EJECT FRACT MOD BP 99901: 54.2

## 2019-05-07 PROCEDURE — 99214 OFFICE O/P EST MOD 30 MIN: CPT | Performed by: INTERNAL MEDICINE

## 2019-05-07 PROCEDURE — 93306 TTE W/DOPPLER COMPLETE: CPT | Mod: 26 | Performed by: INTERNAL MEDICINE

## 2019-05-07 PROCEDURE — 93306 TTE W/DOPPLER COMPLETE: CPT

## 2019-05-07 PROCEDURE — 93000 ELECTROCARDIOGRAM COMPLETE: CPT | Performed by: INTERNAL MEDICINE

## 2019-05-07 RX ORDER — METOPROLOL SUCCINATE 100 MG/1
200 TABLET, EXTENDED RELEASE ORAL DAILY
Qty: 90 TAB | Refills: 3 | COMMUNITY
Start: 2019-05-07 | End: 2020-03-30

## 2019-05-07 RX ORDER — CALCIPOTRIENE 50 UG/G
CREAM TOPICAL
Refills: 3 | COMMUNITY
Start: 2019-02-27 | End: 2019-05-07

## 2019-05-07 RX ORDER — ASPIRIN 81 MG/1
81 TABLET, CHEWABLE ORAL DAILY
Qty: 100 TAB | COMMUNITY
Start: 2019-05-07

## 2019-05-07 RX ORDER — FLUOROURACIL 50 MG/G
CREAM TOPICAL
Refills: 1 | COMMUNITY
Start: 2019-02-27 | End: 2019-05-07

## 2019-05-07 NOTE — PATIENT INSTRUCTIONS
-Echocardiogram- heart pictures to look at the pump function before I can know which medications I can use  -Moderate mitral regurgitation on prior echo, leaky valve, need to see the status of this  -Increase metoprolol to 150 mg daily  -Call the office for side effects such as worsening dizziness: [phone]  -We may need to use amiodarone but will have to watch your liver, thyroid and lung function  -Last option would be AV node ablation and placement of a pacemaker (with 3 wires with something called biventricular pacing).  You have been in afib too long for afib ablation.  -You should always hear results of testing within 5 days with my interpretation, if you do not, send a trueEX message or call the office: [phone].  -Resting heart rate goal under 90.    For monitoring your palpitations you can consider the [employer] with [employer] device for you smartphone:          You can learn more about it at this website:    [email or web address]://[email or web address]/    Can be purchased online, any retailer.

## 2019-05-07 NOTE — PROGRESS NOTES
Subjective:   Chief Complaint:   Chief Complaint   Patient presents with   • Atrial Fibrillation       Aria Solis is a 78 y.o. female who is referred back by Dr. Sarkar for chronic atrial fibrillation with uncontrolled heart rate.  Her A. fib rate in the office today is 132 bpm despite being placed on increased metoprolol which is not 100 mg daily.    She also had a moderate to severely dilated left atrium in 2016 along with moderate mitral regurgitation and low EF at 45 to 50%.  Prior EF in 2016 was 40%.  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  Left heart catheterization April 2014 with a EF of 59%, had proximal 40 to 50% LAD and distal 40% LAD, FFR was 0.91.    Has chronic systolic CHF with [place] heart class III shortness of breath.    Not on anticoagulation despite chads 2 vascular score of 7 including CVA.  Only on aspirin 81 Mg. She has made her decision based on fall risk with low vision and unsteady gait.  Has low vision from macular degeneration, using peripheral vision, uses a cane. Was on warfarin in the past.  DOACs are too expensive.    Was previously on metoprolol but this was switched for propranolol given tremors.    Has hyperlipidemia, LDL 57, try glycerides 196, on Lipitor 20 mg.  Blood pressure controlled on metoprolol and lisinopril.  Has chronic kidney disease stage III.  Has type 2 diabetes.    Has hypothyroidism, last TSH 10.8.    She is reporting fatigue and [place], again, NHYC 3.  She reports dizziness which may be related to vision.    Here with her , they travel a lot.    DATA REVIEWED by me:  ECG 5/7/2019  Atrial fibrillation, rate 132    Echo 5/9/2016  EF 45 to 50%, moderate to severely dilated left atrium, moderate mitral regurgitation, mild LVH, aortic valve sclerosis, RVSP 25 mmHg.    Left heart catheterization 2/6/2014  CONCLUSION:  1.  Ejection fraction of 59%, normal wall motion.  2.  Proximal LAD 40% - 50%, distal LAD 40%.  3.  Proximal LAD FFI  0.91.    Nuclear stress test 6/28/2012  EF 30%, normal perfusion    Carotid ultrasound 2/10/2014  Right vertebral artery has no diastolic flow consistent with possible distal right vertebral artery occlusion, no significant disease in the carotids, normal bilateral subclavian and left vertebral artery exam    CT PE study 6/19/2014  Worsening pulmonary edema, small pleural effusions, mediastinal and hilar adenopathy, no PE    Most recent labs:     4/29/2019 hemoglobin 16.1, , platelets 203, sodium 138, potassium 4.3, creatinine 1.02, GFR 52, LFTs normal, hemoglobin A1c 6.8, LDL cholesterol 57, HDL 41, total cholesterol 137, try glycerides 196, TSH 10.0      Past Medical History:   Diagnosis Date   • Atrial fibrillation (HCC) February 2014    New onset atrial fibrillation.   • Back pain    • CAD (coronary artery disease) February 2014    ACS. Coronary angiogram with normal LVEF, proximal LAD 40-50% stenosis, distal LAD 40% stenosis.   • Cerebellar stroke, acute ([facility]) February 2014    CT scan with acute cerebellar stroke, managed by neurology.   • Cholesterol blood decreased    • Chronic anticoagulation    • H/O total knee replacement 1996    Bilateral   • Hyperlipemia    • Insomnia    • Medical home    • Migraine without aura, with intractable migraine, so stated, without mention of status migrainosus    • Osteoarthritis of more than one site    • Postmenopausal    • Sleep apnea     On CPAP   • Stroke (HCC)      Past Surgical History:   Procedure Laterality Date   • NEURO DEST FACET L/S W/IG SNGL  11/18/2014    Performed by Nelly Lomeli at [facility]S   • NEURO DEST FACET L/S W/IG ADDL  11/18/2014    Performed by Nelly Lomeli at [facility] ORS   • NEURO DEST FACET L/S W/IG ADDL  11/18/2014    Performed by Nelly Lomeli at [facility] ORS   • OTHER ORTHOPEDIC SURGERY  February 2012    Spinal stimulator   • OTHER ORTHOPEDIC SURGERY  2004    Right ankle surgery   • KNEE REPLACEMENT,  TOTAL  Feb/April 1996    Bilateral   • LUMBAR LAMINECTOMY DISKECTOMY  1975/1984    L5 and L6 laminectomy     Family History   Problem Relation Age of Onset   • Other Father         trauma   • Heart Disease Neg Hx    • Heart Attack Neg Hx    • Heart Failure Neg Hx    • Hyperlipidemia Neg Hx      Social History     Social History   • Marital status:      Spouse name: N/A   • Number of children: N/A   • Years of education: N/A     Occupational History   • Not on file.     Social History Main Topics   • Smoking status: Former Smoker     Packs/day: 3.00     Years: 10.00     Types: Cigarettes     Quit date: 9/22/1968   • Smokeless tobacco: Never Used   • Alcohol use No   • Drug use: No   • Sexual activity: Not Currently     Partners: Male     Other Topics Concern   • Not on file     Social History Narrative   • No narrative on file     Allergies   Allergen Reactions   • Darvon [Propoxyphene Hcl]    • Gluten Meal        Current Outpatient Prescriptions   Medication Sig Dispense Refill   • aspirin (ASA) 81 MG Chew Tab chewable tablet Take 1 Tab by mouth every day. 100 Tab    • metoprolol SR (TOPROL XL) 100 MG TABLET SR 24 HR Take 1.5 Tabs by mouth every day. 90 Tab 3   • zolpidem (AMBIEN) 5 MG Tab Take 1 Tab by mouth at bedtime as needed for Sleep for up to 90 days. 90 Tab 1   • albuterol (PROAIR HFA) 108 (90 Base) MCG/ACT Aero Soln inhalation aerosol Inhale 2 Puffs by mouth every 6 hours as needed for Shortness of Breath. 8.5 g 3   • fluticasone (FLONASE) 50 MCG/ACT nasal spray Spray 2 Sprays in nose every day. 16 g 3   • lisinopril (PRINIVIL) 2.5 MG Tab TAKE 1 TABLET DAILY 90 Tab 3   • atorvastatin (LIPITOR) 10 MG Tab Take 1 Tab by mouth every bedtime. 90 Tab 3   • divalproex ER (DEPAKOTE ER) 500 MG TABLET SR 24 HR TAKE 1 TABLET TWICE A  Tab 3   • gabapentin (NEURONTIN) 600 MG tablet Take 1 Tab by mouth 2 times a day. 180 Tab 3   • cyclosporin (RESTASIS) 0.05 % ophthalmic emulsion Place 1 Drop in both eyes  "2 times a day. 3 Each 3   • furosemide (LASIX) 20 MG Tab Take 1 Tab by mouth every day. 90 Tab 3   • Multiple Vitamins-Minerals (OCUVITE ADULT 50+ PO) Take  by mouth every day.       No current facility-administered medications for this visit.        ROS  All others systems reviewed and negative.     Objective:     /80 (BP Location: Left arm, Patient Position: Sitting, BP Cuff Size: Adult)   Pulse (!) 144   Ht 1.727 m (5' 8\")   Wt 84.4 kg (186 lb)   SpO2 91%  Body mass index is 28.28 kg/m².    Physical Exam   General: No acute distress. Well nourished.  HEENT: EOM grossly intact, no scleral icterus, no pharyngeal erythema.   Neck:  No JVD at 90, no bruits, trachea midline  CVS: Irreg irreg, fast. Normal S1, S2. No M/R/G. No LE edema.  2+ radial pulses, 2+ DP pulses  Resp: CTAB. No wheezing or crackles/rhonchi. Normal respiratory effort.  Abdomen: Soft, NT, no juan carlos hepatomegaly.  MSK/Ext: No clubbing or cyanosis.  Skin: Warm and dry, no rashes.  Neurological: CN III-XII grossly intact. No focal deficits. Low vision.  Psych: A&O x 3, appropriate affect, good judgement      Assessment:     1. Atrial fibrillation, unspecified type (HCC)  EKG    metoprolol SR (TOPROL XL) 100 MG TABLET SR 24 HR   2. Chronic atrial fibrillation (HCC)  EC-ECHOCARDIOGRAM COMPLETE W/O CONT   3. History of embolic stroke     4. Tremor     5. Mixed hyperlipidemia     6. Moderate mitral regurgitation     7. ANDRE on CPAP     8. Chronic systolic congestive heart failure (HCC)  EC-ECHOCARDIOGRAM COMPLETE W/O CONT   9. CKD (chronic kidney disease) stage 3, GFR 30-59 ml/min ([facility])     10. Cerebrovascular accident (CVA) due to embolism of cerebral artery (HCC)         Medical Decision Making:  Today's Assessment / Status / Plan:     -Heart rate not controlled, needs more metoprolol  -Needs echo to see EF to know if I can use cardizem, may need amiodarone  -Blood thinner recommended but she is not willing, too concerned re: head bleed and " falls  -Needs to get HR controlled but may have side effects for metoprolol so may need amiodarone.      Written instructions given today:    -Echocardiogram- heart pictures to look at the pump function before I can know which medications I can use  -Moderate mitral regurgitation on prior echo, leaky valve, need to see the status of this  -Increase metoprolol to 150 mg daily  -Call the office for side effects such as worsening dizziness: [phone]  -We may need to use amiodarone but will have to watch your liver, thyroid and lung function  -Last option would be AV node ablation and placement of a pacemaker (with 3 wires with something called biventricular pacing).  You have been in afib too long for afib ablation.  -You should always hear results of testing within 5 days with my interpretation, if you do not, send a MapHazardly message or call the office: [phone].  -Resting heart rate goal under 90.    -D[employer] Life info given    Return in about 3 weeks (around 5/28/2019), or ERYN Arriaza 3 weeks; MD in October.    It is my pleasure to participate in the care of Ms. Solis.  Please do not hesitate to contact me with questions or concerns.    Dina Rosales MD, [facility]  Cardiologist [facility]    Please note that this dictation was created using voice recognition software. I have made every reasonable attempt to correct obvious errors, but it is possible there are errors of grammar and possibly content that I did not discover before finalizing the note.

## 2019-05-07 NOTE — LETTER
[facility]   [address], [address]  [place] [address]  Phone: [phone]  Fax: [phone]              Aria Solis  1940    Encounter Date: 5/7/2019    Dina Rosales M.D.          PROGRESS NOTE:  Subjective:   Chief Complaint:   Chief Complaint   Patient presents with   • Atrial Fibrillation       Aria Solis is a 78 y.o. female who is referred back by Dr. Sarkar for chronic atrial fibrillation with uncontrolled heart rate.  Her A. fib rate in the office today is 132 bpm despite being placed on increased metoprolol which is not 100 mg daily.    She also had a moderate to severely dilated left atrium in 2016 along with moderate mitral regurgitation and low EF at 45 to 50%.  Prior EF in 2016 was 40%.  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  Left heart catheterization April 2014 with a EF of 59%, had proximal 40 to 50% LAD and distal 40% LAD, FFR was 0.91.    Has chronic systolic CHF with [place] York heart class III shortness of breath.    Not on anticoagulation despite chads 2 vascular score of 7 including CVA.  Only on aspirin 81 Mg. She has made her decision based on fall risk with low vision and unsteady gait.  Has low vision from macular degeneration, using peripheral vision, uses a cane. Was on warfarin in the past.  DOACs are too expensive.    Was previously on metoprolol but this was switched for propranolol given tremors.    Has hyperlipidemia, LDL 57, try glycerides 196, on Lipitor 20 mg.  Blood pressure controlled on metoprolol and lisinopril.  Has chronic kidney disease stage III.  Has type 2 diabetes.    Has hypothyroidism, last TSH 10.8.    She is reporting fatigue and [place], again, NHYC 3.  She reports dizziness which may be related to vision.    Here with her , they travel a lot.    DATA REVIEWED by me:  ECG 5/7/2019  Atrial fibrillation, rate 132    Echo 5/9/2016  EF 45 to 50%, moderate to severely  dilated left atrium, moderate mitral regurgitation, mild LVH, aortic valve sclerosis, RVSP 25 mmHg.    Left heart catheterization 2/6/2014  CONCLUSION:  1.  Ejection fraction of 59%, normal wall motion.  2.  Proximal LAD 40% - 50%, distal LAD 40%.  3.  Proximal LAD FFI 0.91.    Nuclear stress test 6/28/2012  EF 30%, normal perfusion    Carotid ultrasound 2/10/2014  Right vertebral artery has no diastolic flow consistent with possible distal right vertebral artery occlusion, no significant disease in the carotids, normal bilateral subclavian and left vertebral artery exam    CT PE study 6/19/2014  Worsening pulmonary edema, small pleural effusions, mediastinal and hilar adenopathy, no PE    Most recent labs:     4/29/2019 hemoglobin 16.1, , platelets 203, sodium 138, potassium 4.3, creatinine 1.02, GFR 52, LFTs normal, hemoglobin A1c 6.8, LDL cholesterol 57, HDL 41, total cholesterol 137, try glycerides 196, TSH 10.0      Past Medical History:   Diagnosis Date   • Atrial fibrillation (HCC) February 2014    New onset atrial fibrillation.   • Back pain    • CAD (coronary artery disease) February 2014    ACS. Coronary angiogram with normal LVEF, proximal LAD 40-50% stenosis, distal LAD 40% stenosis.   • Cerebellar stroke, acute (HCC) February 2014    CT scan with acute cerebellar stroke, managed by neurology.   • Cholesterol blood decreased    • Chronic anticoagulation    • H/O total knee replacement 1996    Bilateral   • Hyperlipemia    • Insomnia    • Medical home    • Migraine without aura, with intractable migraine, so stated, without mention of status migrainosus    • Osteoarthritis of more than one site    • Postmenopausal    • Sleep apnea     On CPAP   • Stroke (HCC)      Past Surgical History:   Procedure Laterality Date   • NEURO DEST FACET L/S W/IG SNGL  11/18/2014    [facility]   • NEURO DEST FACET L/S W/IG ADDL  11/18/2014    Performed by [facility]   • NEURO DEST FACET L/S W/IG ADDL  11/18/2014    Per[facility] by [facility]   • OTHER ORTHOPEDIC SURGERY  February 2012    Spinal stimulator   • OTHER ORTHOPEDIC SURGERY  2004    Right ankle surgery   • KNEE REPLACEMENT, TOTAL  Feb/April 1996    Bilateral   • LUMBAR LAMINECTOMY DISKECTOMY  1975/1984    L5 and L6 laminectomy     Family History   Problem Relation Age of Onset   • Other Father         trauma   • Heart Disease Neg Hx    • Heart Attack Neg Hx    • Heart Failure Neg Hx    • Hyperlipidemia Neg Hx      Social History     Social History   • Marital status:      Spouse name: N/A   • Number of children: N/A   • Years of education: N/A     Occupational History   • Not on file.     Social History Main Topics   • Smoking status: Former Smoker     Packs/day: 3.00     Years: 10.00     Types: Cigarettes     Quit date: 9/22/1968   • Smokeless tobacco: Never Used   • Alcohol use No   • Drug use: No   • Sexual activity: Not Currently     Partners: Male     Other Topics Concern   • Not on file     Social History Narrative   • No narrative on file     Allergies   Allergen Reactions   • Darvon [Propoxyphene Hcl]    • Gluten Meal        Current Outpatient Prescriptions   Medication Sig Dispense Refill   • aspirin (ASA) 81 MG Chew Tab chewable tablet Take 1 Tab by mouth every day. 100 Tab    • metoprolol SR (TOPROL XL) 100 MG TABLET SR 24 HR Take 1.5 Tabs by mouth every day. 90 Tab 3   • zolpidem (AMBIEN) 5 MG Tab Take 1 Tab by mouth at bedtime as needed for Sleep for up to 90 days. 90 Tab 1   • albuterol (PROAIR HFA) 108 (90 Base) MCG/ACT Aero Soln inhalation aerosol Inhale 2 Puffs by mouth every 6 hours as needed for Shortness of Breath. 8.5 g 3   • fluticasone (FLONASE) 50 MCG/ACT nasal spray Spray 2 Sprays in nose every day. 16 g 3   • lisinopril (PRINIVIL) 2.5 MG Tab TAKE 1 TABLET DAILY 90 Tab 3   • atorvastatin (LIPITOR) 10 MG Tab Take 1 Tab by mouth every  "bedtime. 90 Tab 3   • divalproex ER (DEPAKOTE ER) 500 MG TABLET SR 24 HR TAKE 1 TABLET TWICE A  Tab 3   • gabapentin (NEURONTIN) 600 MG tablet Take 1 Tab by mouth 2 times a day. 180 Tab 3   • cyclosporin (RESTASIS) 0.05 % ophthalmic emulsion Place 1 Drop in both eyes 2 times a day. 3 Each 3   • furosemide (LASIX) 20 MG Tab Take 1 Tab by mouth every day. 90 Tab 3   • Multiple Vitamins-Minerals (OCUVITE ADULT 50+ PO) Take  by mouth every day.       No current facility-administered medications for this visit.        ROS  All others systems reviewed and negative.     Objective:     /80 (BP Location: Left arm, Patient Position: Sitting, BP Cuff Size: Adult)   Pulse (!) 144   Ht 1.727 m (5' 8\")   Wt 84.4 kg (186 lb)   SpO2 91%  Body mass index is 28.28 kg/m².    Physical Exam   General: No acute distress. Well nourished.  HEENT: EOM grossly intact, no scleral icterus, no pharyngeal erythema.   Neck:  No JVD at 90, no bruits, trachea midline  CVS: Irreg irreg, fast. Normal S1, S2. No M/R/G. No LE edema.  2+ radial pulses, 2+ DP pulses  Resp: CTAB. No wheezing or crackles/rhonchi. Normal respiratory effort.  Abdomen: Soft, NT, no juan carlos hepatomegaly.  MSK/Ext: No clubbing or cyanosis.  Skin: Warm and dry, no rashes.  Neurological: CN III-XII grossly intact. No focal deficits. Low vision.  Psych: A&O x 3, appropriate affect, good judgement      Assessment:     1. Atrial fibrillation, unspecified type (HCC)  EKG    metoprolol SR (TOPROL XL) 100 MG TABLET SR 24 HR   2. Chronic atrial fibrillation (HCC)  EC-ECHOCARDIOGRAM COMPLETE W/O CONT   3. History of embolic stroke     4. Tremor     5. Mixed hyperlipidemia     6. Moderate mitral regurgitation     7. ANDRE on CPAP     8. Chronic systolic congestive heart failure (HCC)  EC-ECHOCARDIOGRAM COMPLETE W/O CONT   9. CKD (chronic kidney disease) stage 3, GFR 30-59 ml/min ([facility])     10. Cerebrovascular accident (CVA) due to embolism of cerebral artery (HCC)         "       Medical Decision Making:  Today's Assessment / Status / Plan:     -Heart rate not controlled, needs more metoprolol  -Needs echo to see EF to know if I can use cardizem, may need amiodarone  -Blood thinner recommended but she is not willing, too concerned re: head bleed and falls  -Needs to get HR controlled but may have side effects for metoprolol so may need amiodarone.      Written instructions given today:    -Echocardiogram- heart pictures to look at the pump function before I can know which medications I can use  -Moderate mitral regurgitation on prior echo, leaky valve, need to see the status of this  -Increase metoprolol to 150 mg daily  -Call the office for side effects such as worsening dizziness: [phone]  -We may need to use amiodarone but will have to watch your liver, thyroid and lung function  -Last option would be AV node ablation and placement of a pacemaker (with 3 wires with something called biventricular pacing).  You have been in afib too long for afib ablation.  -You should always hear results of testing within 5 days with my interpretation, if you do not, send a [employer] message or call the office: [phone].  -Resting heart rate goal under 90.    -Clip info given    Return in about 3 weeks (around 5/28/2019), or ERYN Arriaza 3 weeks; MD in October.    It is my pleasure to participate in the care of Ms. Solis.  Please do not hesitate to contact me with questions or concerns.    Dina Rosales MD, [facility]  Cardiologist [facility]    Please note that this dictation was created using voice recognition software. I have made every reasonable attempt to correct obvious errors, but it is possible there are errors of grammar and possibly content that I did not discover before finalizing the note.      Jet Sarkar M.D.  [address]  [id number]  [place] [address]  VIA In Basket

## 2019-05-08 ENCOUNTER — TELEPHONE (OUTPATIENT)
Dept: CARDIOLOGY | Facility: MEDICAL CENTER | Age: 79
End: 2019-05-08

## 2019-05-08 NOTE — TELEPHONE ENCOUNTER
Dina Rosales M.D.  Ana Alonso, R.N.             Your heart pump function is reduced because of the atrial fibrillation so we need to get the heart rate down.   Because it is under 40%, I cannot use a medication called Cardizem so we need to use metoprolol or amiodarone.   After taking the metoprolol 150 mg, if you are not having significant side effects, you will need to increase it to 200 mg to get better heart rate control.   If you are not able to tolerate the higher doses of metoprolol, we will have to start amiodarone.   Please keep me posted on how you are tolerating the metoprolol.   We need to get your resting heart rate down closer to 90 bpm.   Fortunately, the mitral valve looks the same so we do not need to do anything about that.   ZUHAIR Rosales      Spoke to pt via telephone to insure echo result notes were understood. Pt verbalizes understanding and will call next week with an update.

## 2019-05-09 ENCOUNTER — TELEPHONE (OUTPATIENT)
Dept: CARDIOLOGY | Facility: MEDICAL CENTER | Age: 79
End: 2019-05-09

## 2019-05-09 NOTE — TELEPHONE ENCOUNTER
Florence Alonso R.N.   Phone Number: [phone]             JEREMIAH/joseph     Pt calling to ask what heart monitor she needs and if she really needs it at all.     Please call [place] .      Spoke to pt. Educated re: Sabrina pete as suggested at recent office visit. Pt verbalized understanding.

## 2019-06-12 ENCOUNTER — OFFICE VISIT (OUTPATIENT)
Dept: CARDIOLOGY | Facility: MEDICAL CENTER | Age: 79
End: 2019-06-12
Payer: MEDICARE

## 2019-06-12 VITALS
BODY MASS INDEX: 28.42 KG/M2 | WEIGHT: 187.5 LBS | HEIGHT: 68 IN | DIASTOLIC BLOOD PRESSURE: 64 MMHG | SYSTOLIC BLOOD PRESSURE: 92 MMHG | HEART RATE: 62 BPM | OXYGEN SATURATION: 92 %

## 2019-06-12 DIAGNOSIS — I48.20 CHRONIC ATRIAL FIBRILLATION (HCC): ICD-10-CM

## 2019-06-12 DIAGNOSIS — E78.2 MIXED HYPERLIPIDEMIA: ICD-10-CM

## 2019-06-12 DIAGNOSIS — I25.10 ATHEROSCLEROSIS OF NATIVE CORONARY ARTERY OF NATIVE HEART WITHOUT ANGINA PECTORIS: ICD-10-CM

## 2019-06-12 DIAGNOSIS — Z79.899 HIGH RISK MEDICATION USE: ICD-10-CM

## 2019-06-12 DIAGNOSIS — I34.0 MODERATE MITRAL REGURGITATION: ICD-10-CM

## 2019-06-12 PROCEDURE — 99214 OFFICE O/P EST MOD 30 MIN: CPT | Performed by: NURSE PRACTITIONER

## 2019-06-12 ASSESSMENT — ENCOUNTER SYMPTOMS
WHEEZING: 0
DIZZINESS: 1
CHILLS: 0
NAUSEA: 0
HEADACHES: 0
SPUTUM PRODUCTION: 0
PND: 0
PALPITATIONS: 0
HEMOPTYSIS: 0
COUGH: 0
VOMITING: 0
FEVER: 0
CLAUDICATION: 0
ORTHOPNEA: 0
SHORTNESS OF BREATH: 0

## 2019-06-12 NOTE — PROGRESS NOTES
Chief Complaint   Patient presents with   • Atrial Fibrillation       Subjective:   Sofya Knapp is a 78 y.o. female who presents today for chronic atrial fibrillation with uncontrolled heart rate, CAD, HLD, and moderate MR.  Patient was last seen by Dr. ZHUAIR Rosales 5/7/2019.    Echocardiogram was ordered for further evaluation of the patient's known MR.  EF has further reduced to 35-40%, global hypokinesis, moderately enlarged LA, moderate MR.    Blood pressure was elevated at her last office visit of 132 bpm, so metoprolol SR was increased to 150 mg daily.  Patient reports her heart rate is 60-1 140s.  She is extremely concerned about her reduced EF and is going to seek a second opinion through [facility] and Dr. Rueda.    In 2016 the patient had moderate-severely dilated LA with moderate MR and EF 45-50%.  2014 angiogram was performed with EF 59%, proximal 40-50% LAD, distal 40% LAD, FFR was 0.91.    Patient has had chronic systolic heart failure with NYHA class III, stage C with SOB with exertion.  She has a [id number] risk   Score of 7 including CVA, however is only on aspirin 81 mg.  She declines anticoagulation due to fall risk and being legally blind in 1 9 with unsteady gait.  She uses a cane.  She has been on warfarin in the past.  DOACa are too expensive.    Recent labs include , , HDL 41, LDL 57, CMP relatively normal with BUN elevated at 24, glucose 139, TSH 10.800, free T4 0.93.    Ultimately this patient is somewhat frustrated in that she has been told she is not a candidate for ablation due to the chronicity of her atrial fibrillation.  She has a new watch which monitors her heart rate and seeing her heart rate elevated into the 140s is very stressful to her, per her report.  She has some difficulties with trying to figure out Kardia, thus bought her new fancy watch.  Ultimately I will have her call and make an appointment for second opinion at [facility] with Dr. Rueda.  I  have explained that in the interim it is still very important for us to continue to work on reducing her heart rate.  She is to increase her metoprolol SR to 200 mg daily.    Past Medical History:   Diagnosis Date   • Atrial fibrillation (HCC) February 2014    New onset atrial fibrillation.   • Back pain    • CAD (coronary artery disease) February 2014    ACS. Coronary angiogram with normal LVEF, proximal LAD 40-50% stenosis, distal LAD 40% stenosis.   • Cerebellar stroke, acute (HCC) February 2014    CT scan with acute cerebellar stroke, managed by neurology.   • Cholesterol blood decreased    • Chronic anticoagulation    • H/O total knee replacement 1996    Bilateral   • Hyperlipemia    • Insomnia    • Medical home    • Migraine without aura, with intractable migraine, so stated, without mention of status migrainosus    • Osteoarthritis of more than one site    • Postmenopausal    • Sleep apnea     On CPAP   • Stroke (HCC)      Past Surgical History:   Procedure Laterality Date   • NEURO DEST FACET L/S W/IG SNGL  11/18/2014    Performed by Nelly Lomeli at [facility]   • NEURO DEST FACET L/S W/IG ADDL  11/18/2014    Performed by Nelly Lomeli at [facility]   • NEURO DEST FACET L/S W/IG ADDL  11/18/2014    Performed by [facility]eli [facility]   • OTHER ORTHOPEDIC SURGERY  February 2012    Spinal stimulator   • OTHER ORTHOPEDIC SURGERY  2004    Right ankle surgery   • KNEE REPLACEMENT, TOTAL  Feb/April 1996    Bilateral   • LUMBAR LAMINECTOMY DISKECTOMY  1975/1984    L5 and L6 laminectomy     Family History   Problem Relation Age of Onset   • Other Father         trauma   • Heart Disease Neg Hx    • Heart Attack Neg Hx    • Heart Failure Neg Hx    • Hyperlipidemia Neg Hx      Social History     Social History   • Marital status:      Spouse name: N/A   • Number of children: N/A   • Years of education: N/A     Occupational History   • Not on file.     Social History  Main Topics   • Smoking status: Former Smoker     Packs/day: 3.00     Years: 10.00     Types: Cigarettes     Quit date: 9/22/1968   • Smokeless tobacco: Never Used   • Alcohol use No   • Drug use: No   • Sexual activity: Not Currently     Partners: Male     Other Topics Concern   • Not on file     Social History Narrative   • No narrative on file     Allergies   Allergen Reactions   • Darvon [Propoxyphene Hcl]    • Gluten Meal      Outpatient Encounter Prescriptions as of 6/12/2019   Medication Sig Dispense Refill   • aspirin (ASA) 81 MG Chew Tab chewable tablet Take 1 Tab by mouth every day. 100 Tab    • metoprolol SR (TOPROL XL) 100 MG TABLET SR 24 HR Take 1.5 Tabs by mouth every day. 90 Tab 3   • zolpidem (AMBIEN) 5 MG Tab Take 1 Tab by mouth at bedtime as needed for Sleep for up to 90 days. 90 Tab 1   • fluticasone (FLONASE) 50 MCG/ACT nasal spray Spray 2 Sprays in nose every day. 16 g 3   • lisinopril (PRINIVIL) 2.5 MG Tab TAKE 1 TABLET DAILY 90 Tab 3   • atorvastatin (LIPITOR) 10 MG Tab Take 1 Tab by mouth every bedtime. 90 Tab 3   • divalproex ER (DEPAKOTE ER) 500 MG TABLET SR 24 HR TAKE 1 TABLET TWICE A  Tab 3   • gabapentin (NEURONTIN) 600 MG tablet Take 1 Tab by mouth 2 times a day. 180 Tab 3   • cyclosporin (RESTASIS) 0.05 % ophthalmic emulsion Place 1 Drop in both eyes 2 times a day. 3 Each 3   • Multiple Vitamins-Minerals (OCUVITE ADULT 50+ PO) Take  by mouth every day.     • albuterol (PROAIR HFA) 108 (90 Base) MCG/ACT Aero Soln inhalation aerosol Inhale 2 Puffs by mouth every 6 hours as needed for Shortness of Breath. (Patient not taking: Reported on 6/12/2019) 8.5 g 3   • furosemide (LASIX) 20 MG Tab Take 1 Tab by mouth every day. (Patient not taking: Reported on 6/12/2019) 90 Tab 3     No facility-administered encounter medications on file as of 6/12/2019.      Review of Systems   Constitutional: Negative for chills and fever.   Respiratory: Negative for cough, hemoptysis, sputum production,  "shortness of breath and wheezing.    Cardiovascular: Positive for chest pain. Negative for palpitations, orthopnea, claudication, leg swelling and PND.   Gastrointestinal: Negative for nausea and vomiting.   Neurological: Positive for dizziness. Negative for headaches.   All other systems reviewed and are negative.       Objective:   BP (!) 92/64 (BP Location: Left arm, Patient Position: Sitting, BP Cuff Size: Adult)   Pulse 62   Ht 1.727 m (5' 8\")   Wt 85.1 kg (187 lb 8 oz)   SpO2 92%   BMI 28.51 kg/m²     Physical Exam   Constitutional: She is oriented to person, place, and time. She appears well-developed and well-nourished.   HENT:   Head: Normocephalic and atraumatic.   Eyes: EOM are normal.   Neck: Neck supple.   Cardiovascular: Normal heart sounds.  An irregularly irregular rhythm present. Tachycardia present.    No murmur heard.  Pulses:       Carotid pulses are 2+ on the right side, and 2+ on the left side.       Radial pulses are 2+ on the right side, and 2+ on the left side.   Pulmonary/Chest: Effort normal. She has no wheezes. She has rales in the right lower field and the left lower field.   Abdominal: Soft.   Musculoskeletal: She exhibits no edema.   Neurological: She is alert and oriented to person, place, and time.   Skin: Skin is warm and dry.   Psychiatric: She has a normal mood and affect. Her behavior is normal. Judgment and thought content normal.   Nursing note and vitals reviewed.      Echocardiogram 5/7/2019  CONCLUSIONS  Prior echo 5/6/16, EF is further reduced, MR still moderate.  Pt in afib with RVR during the evaluation.  Moderately reduced left ventricular systolic function.  Left ventricular ejection fraction is visually estimated to be 35-40%   with beat to beat variability due to afib.  Global hypokinesis.  Moderately enlarged left atrium.  Moderate mitral regurgitation.  Aortic sclerosis without stenosis.  Unable to estimate pulmonary artery pressure due to an inadequate "   tricuspid regurgitant jet.  Normal inferior vena cava size and inspiratory collapse.    Assessment:     1. Chronic atrial fibrillation (HCC)     2. Chronic anticoagulation     3. Atherosclerosis of native coronary artery of native heart without angina pectoris     4. Mixed hyperlipidemia     5. Moderate mitral regurgitation         Medical Decision Making:  Today's Assessment / Status / Plan:   1.  Chronic atrial fibrillation  -NOT on anticoagulation per patient request due to poor vision and unsteady gait  -Metoprolol  mg daily  - Can start amiodarone in the future if rate still uncontrolled  - Obtain pulmonary function tests if start amiodarone, scheduled    2.  CAD  - ASA 81 mg, atorvastatin 10 mg daily    3.  Moderate MR  -Stable for recent echo    Collaborating MD is Dr. Terrazas.  Follow-up visit in 1 month with DB.    Please note that this dictation was created using voice recognition software.  I have made every reasonable attempt to correct obvious errors, but it is possible there are errors of grammar or possibly content that I did not discover before finalizing the note.

## 2019-07-02 ENCOUNTER — PATIENT MESSAGE (OUTPATIENT)
Dept: HEALTH INFORMATION MANAGEMENT | Facility: OTHER | Age: 79
End: 2019-07-02

## 2019-07-17 ENCOUNTER — OFFICE VISIT (OUTPATIENT)
Dept: CARDIOLOGY | Facility: MEDICAL CENTER | Age: 79
End: 2019-07-17
Payer: MEDICARE

## 2019-07-17 VITALS
HEART RATE: 70 BPM | OXYGEN SATURATION: 92 % | SYSTOLIC BLOOD PRESSURE: 96 MMHG | DIASTOLIC BLOOD PRESSURE: 64 MMHG | WEIGHT: 184.08 LBS | HEIGHT: 68 IN | BODY MASS INDEX: 27.9 KG/M2

## 2019-07-17 DIAGNOSIS — E78.2 MIXED HYPERLIPIDEMIA: ICD-10-CM

## 2019-07-17 DIAGNOSIS — I25.10 ATHEROSCLEROSIS OF NATIVE CORONARY ARTERY OF NATIVE HEART WITHOUT ANGINA PECTORIS: ICD-10-CM

## 2019-07-17 DIAGNOSIS — I50.22 CHRONIC SYSTOLIC CONGESTIVE HEART FAILURE (HCC): ICD-10-CM

## 2019-07-17 DIAGNOSIS — I48.20 CHRONIC ATRIAL FIBRILLATION (HCC): ICD-10-CM

## 2019-07-17 PROCEDURE — 99214 OFFICE O/P EST MOD 30 MIN: CPT | Performed by: NURSE PRACTITIONER

## 2019-07-17 ASSESSMENT — ENCOUNTER SYMPTOMS
HEMOPTYSIS: 0
VOMITING: 0
SHORTNESS OF BREATH: 0
WHEEZING: 0
CHILLS: 0
PALPITATIONS: 1
ORTHOPNEA: 0
DIZZINESS: 0
FEVER: 0
COUGH: 0
HEADACHES: 0
PND: 0
CLAUDICATION: 0
NAUSEA: 0
SPUTUM PRODUCTION: 0

## 2019-07-17 NOTE — PROGRESS NOTES
Chief Complaint   Patient presents with   • Atrial Fibrillation       Subjective:   Aria Solis is a 79 y.o. female who presents today for chronic atrial fibrillation with uncontrolled heart rate, CAD, HLD, and moderate MR.    Previous echocardiogram indicates further reduction of the EF to 35-40%, global hypokinesis, moderately enlarged LA, moderate MR.    Patient has had chronic systolic heart failure with NYHA class III, stage C with SOB with exertion.  She has a [id number] risk Score of 7 including CVA, however is only on aspirin 81 mg.  She declines anticoagulation due to fall risk and being legally blind in 1 eye with unsteady gait.  She uses a cane.  She has been on warfarin in the past.  DOACa are too expensive.    I discussed the possibility of adding in a second medication for rate control.  Patient is declining at this time as she is going to be leaving in a week to travel to [facility] to see polar bears and will not be back till November.  She is already scheduled to see Dr. Rosales and this can be discussed at that time and possibly anticoagulation as well.    Ultimately she is asymptomatic and has no complaints today.    Past Medical History:   Diagnosis Date   • Atrial fibrillation ([facility]) February 2014    New onset atrial fibrillation.   • Back pain    • CAD (coronary artery disease) February 2014    ACS. Coronary angiogram with normal LVEF, proximal LAD 40-50% stenosis, distal LAD 40% stenosis.   • Cerebellar stroke, acute ([facility]) February 2014    CT scan with acute cerebellar stroke, managed by neurology.   • Cholesterol blood decreased    • Chronic anticoagulation    • H/O total knee replacement 1996    Bilateral   • Hyperlipemia    • Insomnia    • Medical home    • Migraine without aura, with intractable migraine, so stated, without mention of status migrainosus    • Osteoarthritis of more than one site    • Postmenopausal    • Sleep apnea     On CPAP   • Stroke ([facility])      Past  Surgical History:   Procedure Laterality Date   • NEURO DEST FACET L/S W/IG SNGL  11/18/2014    Performed by Nelly Lomeli at SURGERY SURGICAL [facility] ORS   • NEURO DEST FACET L/S W/IG ADDL  11/18/2014    Performed by Nelly Lomeli at [facility] ORS   • NEURO DEST FACET L/S W/IG ADDL  11/18/2014    Performed by Nelly Lomeli at [facility] ORS   • OTHER ORTHOPEDIC SURGERY  February 2012    Spinal stimulator   • OTHER ORTHOPEDIC SURGERY  2004    Right ankle surgery   • KNEE REPLACEMENT, TOTAL  Feb/April 1996    Bilateral   • LUMBAR LAMINECTOMY DISKECTOMY  1975/1984    L5 and L6 laminectomy     Family History   Problem Relation Age of Onset   • Other Father         trauma   • Heart Disease Neg Hx    • Heart Attack Neg Hx    • Heart Failure Neg Hx    • Hyperlipidemia Neg Hx      Social History     Social History   • Marital status:      Spouse name: N/A   • Number of children: N/A   • Years of education: N/A     Occupational History   • Not on file.     Social History Main Topics   • Smoking status: Former Smoker     Packs/day: 3.00     Years: 10.00     Types: Cigarettes     Quit date: 9/22/1968   • Smokeless tobacco: Never Used   • Alcohol use No   • Drug use: No   • Sexual activity: Not Currently     Partners: Male     Other Topics Concern   • Not on file     Social History Narrative   • No narrative on file     Allergies   Allergen Reactions   • Darvon [Propoxyphene Hcl]    • Gluten Meal      Outpatient Encounter Prescriptions as of 7/17/2019   Medication Sig Dispense Refill   • aspirin (ASA) 81 MG Chew Tab chewable tablet Take 1 Tab by mouth every day. 100 Tab    • metoprolol SR (TOPROL XL) 100 MG TABLET SR 24 HR Take 200 mg by mouth every day. 90 Tab 3   • zolpidem (AMBIEN) 5 MG Tab Take 1 Tab by mouth at bedtime as needed for Sleep for up to 90 days. 90 Tab 1   • albuterol (PROAIR HFA) 108 (90 Base) MCG/ACT Aero Soln inhalation aerosol Inhale 2 Puffs by mouth every 6 hours as needed for  "Shortness of Breath. 8.5 g 3   • fluticasone (FLONASE) 50 MCG/ACT nasal spray Spray 2 Sprays in nose every day. 16 g 3   • lisinopril (PRINIVIL) 2.5 MG Tab TAKE 1 TABLET DAILY 90 Tab 3   • atorvastatin (LIPITOR) 10 MG Tab Take 1 Tab by mouth every bedtime. 90 Tab 3   • divalproex ER (DEPAKOTE ER) 500 MG TABLET SR 24 HR TAKE 1 TABLET TWICE A  Tab 3   • gabapentin (NEURONTIN) 600 MG tablet Take 1 Tab by mouth 2 times a day. 180 Tab 3   • cyclosporin (RESTASIS) 0.05 % ophthalmic emulsion Place 1 Drop in both eyes 2 times a day. 3 Each 3   • Multiple Vitamins-Minerals (OCUVITE ADULT 50+ PO) Take  by mouth every day.       No facility-administered encounter medications on file as of 7/17/2019.      Review of Systems   Constitutional: Negative for chills and fever.   Respiratory: Negative for cough, hemoptysis, sputum production, shortness of breath and wheezing.    Cardiovascular: Positive for palpitations. Negative for chest pain, orthopnea, claudication, leg swelling and PND.   Gastrointestinal: Negative for nausea and vomiting.   Neurological: Negative for dizziness and headaches.   All other systems reviewed and are negative.       Objective:   BP (!) 96/64 (BP Location: Right arm, Patient Position: Sitting, BP Cuff Size: Adult)   Pulse 70   Ht 1.727 m (5' 8\")   Wt 83.5 kg (184 lb 1.4 oz)   SpO2 92%   BMI 27.99 kg/m²     Physical Exam   Constitutional: She appears well-developed and well-nourished.   Eyes: EOM are normal.   Neck: Neck supple. No JVD present.   Cardiovascular: Normal rate, regular rhythm and normal heart sounds.    No murmur heard.  Pulmonary/Chest: Effort normal and breath sounds normal.   Abdominal: Soft.   Neurological:   Cranial nerves II-XII WNL   Skin: Skin is warm and dry.   Psychiatric: She has a normal mood and affect. Her behavior is normal. Judgment and thought content normal.   Nursing note and vitals reviewed.      Assessment:     1. Chronic atrial fibrillation (HCC)     2. " Atherosclerosis of native coronary artery of native heart without angina pectoris     3. Mixed hyperlipidemia     4. Chronic systolic congestive heart failure (HCC)         Medical Decision Making:  Today's Assessment / Status / Plan:   1.  Chronic A. fib  -Continue metoprolol  mg 2 tablets daily  - Patient declining anticoagulation in spite of [id number] risk score of 7 including CVA  -Asymptomatic    2.  CAD  -Continue ASA 81 mg and atorvastatin 10 mg    3.  Mixed hyperlipidemia  -Continue ASA and atorvastatin    4.  Chronic systolic heart failure  - Continue lisinopril 2.5 mg daily and metoprolol  mg daily    Collaborating MD is Dr. Pereira.  Follow-up visit in November with Dr Rosales, already scheduled.    Please note that this dictation was created using voice recognition software.  I have made every reasonable attempt to correct obvious errors, but it is possible there are errors of grammar or possibly content that I did not discover before finalizing the note.

## 2019-08-02 ENCOUNTER — OFFICE VISIT (OUTPATIENT)
Dept: MEDICAL GROUP | Facility: MEDICAL CENTER | Age: 79
End: 2019-08-02
Payer: MEDICARE

## 2019-08-02 VITALS
HEIGHT: 68 IN | RESPIRATION RATE: 14 BRPM | DIASTOLIC BLOOD PRESSURE: 62 MMHG | OXYGEN SATURATION: 94 % | BODY MASS INDEX: 28.04 KG/M2 | SYSTOLIC BLOOD PRESSURE: 102 MMHG | WEIGHT: 185 LBS | HEART RATE: 60 BPM | TEMPERATURE: 98.8 F

## 2019-08-02 DIAGNOSIS — R73.01 ELEVATED FASTING BLOOD SUGAR: ICD-10-CM

## 2019-08-02 DIAGNOSIS — E03.8 SUBCLINICAL HYPOTHYROIDISM: ICD-10-CM

## 2019-08-02 DIAGNOSIS — G43.019 INTRACTABLE MIGRAINE WITHOUT AURA AND WITHOUT STATUS MIGRAINOSUS: ICD-10-CM

## 2019-08-02 DIAGNOSIS — Z78.0 ASYMPTOMATIC MENOPAUSAL STATE: ICD-10-CM

## 2019-08-02 DIAGNOSIS — N18.30 CKD (CHRONIC KIDNEY DISEASE) STAGE 3, GFR 30-59 ML/MIN (HCC): Chronic | ICD-10-CM

## 2019-08-02 DIAGNOSIS — E78.2 MIXED HYPERLIPIDEMIA: ICD-10-CM

## 2019-08-02 LAB
HBA1C MFR BLD: 5.8 % (ref 0–5.6)
INT CON NEG: NEGATIVE
INT CON POS: POSITIVE

## 2019-08-02 PROCEDURE — 83036 HEMOGLOBIN GLYCOSYLATED A1C: CPT | Performed by: FAMILY MEDICINE

## 2019-08-02 PROCEDURE — 99214 OFFICE O/P EST MOD 30 MIN: CPT | Performed by: FAMILY MEDICINE

## 2019-08-02 RX ORDER — DIVALPROEX SODIUM 500 MG/1
500 TABLET, EXTENDED RELEASE ORAL 2 TIMES DAILY
Qty: 180 TAB | Refills: 3 | Status: SHIPPED | OUTPATIENT
Start: 2019-08-02 | End: 2020-01-28 | Stop reason: SDUPTHER

## 2019-08-02 RX ORDER — ATORVASTATIN CALCIUM 10 MG/1
10 TABLET, FILM COATED ORAL
Qty: 90 TAB | Refills: 3 | Status: SHIPPED | OUTPATIENT
Start: 2019-08-02 | End: 2020-07-27

## 2019-08-02 NOTE — PROGRESS NOTES
Subjective:   Aria Solis is a 79 y.o. female here today for elevated blood sugar    Intractable migraine without aura  Migraines are controlled with Depakote 500 mg twice daily.  She is requesting a refill because she will be going out of the area on vacation for 3 months.    Mixed hyperlipidemia  Patient is tolerating atorvastatin 10 mg daily.    Elevated fasting blood sugar  Patient had an A1c of 6.8 with last blood test 3 months ago.  A1c in clinic today is 5.8.         Current medicines (including changes today)  Current Outpatient Medications   Medication Sig Dispense Refill   • atorvastatin (LIPITOR) 10 MG Tab Take 1 Tab by mouth every bedtime. 90 Tab 3   • divalproex ER (DEPAKOTE ER) 500 MG TABLET SR 24 HR Take 1 Tab by mouth 2 Times a Day. 180 Tab 3   • aspirin (ASA) 81 MG Chew Tab chewable tablet Take 1 Tab by mouth every day. 100 Tab    • metoprolol SR (TOPROL XL) 100 MG TABLET SR 24 HR Take 200 mg by mouth every day. 90 Tab 3   • albuterol (PROAIR HFA) 108 (90 Base) MCG/ACT Aero Soln inhalation aerosol Inhale 2 Puffs by mouth every 6 hours as needed for Shortness of Breath. 8.5 g 3   • fluticasone (FLONASE) 50 MCG/ACT nasal spray Spray 2 Sprays in nose every day. 16 g 3   • lisinopril (PRINIVIL) 2.5 MG Tab TAKE 1 TABLET DAILY 90 Tab 3   • gabapentin (NEURONTIN) 600 MG tablet Take 1 Tab by mouth 2 times a day. 180 Tab 3   • cyclosporin (RESTASIS) 0.05 % ophthalmic emulsion Place 1 Drop in both eyes 2 times a day. 3 Each 3   • Multiple Vitamins-Minerals (OCUVITE ADULT 50+ PO) Take  by mouth every day.       No current facility-administered medications for this visit.      She  has a past medical history of Atrial fibrillation (HCC) (February 2014), Back pain, CAD (coronary artery disease) (February 2014), Cerebellar stroke, acute (HCC) (February 2014), Cholesterol blood decreased, Chronic anticoagulation, H/O total knee replacement (1996), Hyperlipemia, Insomnia, Medical home, Migraine  "without aura, with intractable migraine, so stated, without mention of status migrainosus, Osteoarthritis of more than one site, Postmenopausal, Sleep apnea, and Stroke (HCC).    ROS   No headache, no fever       Objective:     /62 (BP Location: Right arm, Patient Position: Sitting)   Pulse 60   Temp 37.1 °C (98.8 °F)   Resp 14   Ht 1.727 m (5' 8\")   Wt 83.9 kg (185 lb)   SpO2 94%  Body mass index is 28.13 kg/m².   Physical Exam:  Constitutional: Alert, no distress.  Skin: Warm, dry, good turgor, no rashes in visible areas.  Eye: Equal, round and reactive, conjunctiva clear, lids normal.  Psych: Alert and oriented x3, normal affect and mood.        Assessment and Plan:   The following treatment plan was discussed    1. Elevated fasting blood sugar  Improved.  Encouraged regular exercise.  Follow-up labs in 6 months.  - HEMOGLOBIN A1C; Future  - POCT A1C    2. CKD (chronic kidney disease) stage 3, GFR 30-59 ml/min ([facility])  Follow-up labs in 6 months.  - CBC WITH DIFFERENTIAL; Future  - Comp Metabolic Panel; Future    3. Subclinical hypothyroidism  Follow-up labs in 6 months.  - TSH WITH REFLEX TO FT4; Future    4. Mixed hyperlipidemia  Refill atorvastatin. Follow-up labs in 6 months.  - atorvastatin (LIPITOR) 10 MG Tab; Take 1 Tab by mouth every bedtime.  Dispense: 90 Tab; Refill: 3  - Lipid Profile; Future    5. Intractable migraine without aura and without status migrainosus  Controlled.  Continue Depakote.  - divalproex ER (DEPAKOTE ER) 500 MG TABLET SR 24 HR; Take 1 Tab by mouth 2 Times a Day.  Dispense: 180 Tab; Refill: 3    6. Asymptomatic menopausal state  - DS-BONE DENSITY STUDY (DEXA); Future      Followup: Return in about 6 months (around 2/2/2020) for Labs.         "

## 2019-08-02 NOTE — ASSESSMENT & PLAN NOTE
Migraines are controlled with Depakote 500 mg twice daily.  She is requesting a refill because she will be going out of the area on vacation for 3 months.

## 2019-11-11 ENCOUNTER — TELEPHONE (OUTPATIENT)
Dept: MEDICAL GROUP | Facility: MEDICAL CENTER | Age: 79
End: 2019-11-11

## 2019-11-11 ENCOUNTER — HOSPITAL ENCOUNTER (OUTPATIENT)
Dept: RADIOLOGY | Facility: MEDICAL CENTER | Age: 79
End: 2019-11-11
Attending: FAMILY MEDICINE
Payer: MEDICARE

## 2019-11-11 DIAGNOSIS — Z78.0 ASYMPTOMATIC MENOPAUSAL STATE: ICD-10-CM

## 2019-11-11 PROCEDURE — 77080 DXA BONE DENSITY AXIAL: CPT

## 2019-11-11 NOTE — TELEPHONE ENCOUNTER
----- Message from Jet Sarkar M.D. sent at 11/11/2019 10:01 AM PST -----  Please notify patient that her bone density is normal.  Jet Sarkar M.D.

## 2020-01-07 ENCOUNTER — PATIENT MESSAGE (OUTPATIENT)
Dept: HEALTH INFORMATION MANAGEMENT | Facility: OTHER | Age: 80
End: 2020-01-07

## 2020-01-27 DIAGNOSIS — M54.10 RADICULAR PAIN: ICD-10-CM

## 2020-01-27 RX ORDER — GABAPENTIN 600 MG/1
600 TABLET ORAL 2 TIMES DAILY
Qty: 180 TAB | Refills: 3 | Status: SHIPPED | OUTPATIENT
Start: 2020-01-27

## 2020-01-27 NOTE — TELEPHONE ENCOUNTER
Requested Prescriptions     Pending Prescriptions Disp Refills   • gabapentin (NEURONTIN) 600 MG tablet 180 Tab 3     Sig: Take 1 Tab by mouth 2 times a day.   Was the patient seen in the last year in this department? Yes    Does patient have an active prescription for medications requested? Yes    Received Request Via: Patient

## 2020-01-28 DIAGNOSIS — G43.019 INTRACTABLE MIGRAINE WITHOUT AURA AND WITHOUT STATUS MIGRAINOSUS: ICD-10-CM

## 2020-01-28 RX ORDER — DIVALPROEX SODIUM 500 MG/1
500 TABLET, EXTENDED RELEASE ORAL 2 TIMES DAILY
Qty: 180 TAB | Refills: 3 | Status: SHIPPED | OUTPATIENT
Start: 2020-01-28 | End: 2020-05-29 | Stop reason: SDUPTHER

## 2020-02-04 ENCOUNTER — ANTICOAGULATION MONITORING (OUTPATIENT)
Dept: VASCULAR LAB | Facility: MEDICAL CENTER | Age: 80
End: 2020-02-04

## 2020-02-04 DIAGNOSIS — Z86.73 HISTORY OF EMBOLIC STROKE: ICD-10-CM

## 2020-02-04 NOTE — PROGRESS NOTES
Discharged from [facility].  Pt has been transitioned to asa  Iliana Filter, Clinical Pharmacist, CDE, CACP

## 2020-02-06 RX ORDER — LISINOPRIL 2.5 MG/1
TABLET ORAL
Qty: 90 TAB | Refills: 4 | Status: SHIPPED | OUTPATIENT
Start: 2020-02-06

## 2020-03-29 DIAGNOSIS — I48.91 ATRIAL FIBRILLATION, UNSPECIFIED TYPE (HCC): ICD-10-CM

## 2020-03-30 RX ORDER — METOPROLOL SUCCINATE 100 MG/1
TABLET, EXTENDED RELEASE ORAL
Qty: 90 TAB | Refills: 3 | Status: SHIPPED | OUTPATIENT
Start: 2020-03-30

## 2020-05-12 ENCOUNTER — NURSE TRIAGE (OUTPATIENT)
Dept: HEALTH INFORMATION MANAGEMENT | Facility: OTHER | Age: 80
End: 2020-05-12

## 2020-05-12 ENCOUNTER — APPOINTMENT (OUTPATIENT)
Dept: RADIOLOGY | Facility: IMAGING CENTER | Age: 80
End: 2020-05-12
Attending: FAMILY MEDICINE
Payer: MEDICARE

## 2020-05-12 ENCOUNTER — OFFICE VISIT (OUTPATIENT)
Dept: URGENT CARE | Facility: CLINIC | Age: 80
End: 2020-05-12
Payer: MEDICARE

## 2020-05-12 VITALS
RESPIRATION RATE: 14 BRPM | BODY MASS INDEX: 28.64 KG/M2 | SYSTOLIC BLOOD PRESSURE: 108 MMHG | WEIGHT: 189 LBS | TEMPERATURE: 98 F | DIASTOLIC BLOOD PRESSURE: 56 MMHG | HEIGHT: 68 IN | OXYGEN SATURATION: 95 % | HEART RATE: 93 BPM

## 2020-05-12 DIAGNOSIS — R05.9 COUGH: ICD-10-CM

## 2020-05-12 DIAGNOSIS — J20.9 ACUTE BRONCHITIS, UNSPECIFIED ORGANISM: ICD-10-CM

## 2020-05-12 DIAGNOSIS — R06.02 SHORTNESS OF BREATH: ICD-10-CM

## 2020-05-12 DIAGNOSIS — J22 ACUTE LOWER RESPIRATORY INFECTION: ICD-10-CM

## 2020-05-12 PROCEDURE — 99214 OFFICE O/P EST MOD 30 MIN: CPT | Performed by: FAMILY MEDICINE

## 2020-05-12 PROCEDURE — 71046 X-RAY EXAM CHEST 2 VIEWS: CPT | Mod: TC | Performed by: FAMILY MEDICINE

## 2020-05-12 RX ORDER — PREDNISONE 20 MG/1
20 TABLET ORAL DAILY
Qty: 5 TAB | Refills: 0 | Status: SHIPPED | OUTPATIENT
Start: 2020-05-12 | End: 2020-05-17

## 2020-05-12 RX ORDER — DOXYCYCLINE HYCLATE 100 MG
100 TABLET ORAL 2 TIMES DAILY
Qty: 14 TAB | Refills: 0 | Status: SHIPPED | OUTPATIENT
Start: 2020-05-12 | End: 2020-05-19

## 2020-05-12 RX ORDER — ALBUTEROL SULFATE 90 UG/1
AEROSOL, METERED RESPIRATORY (INHALATION)
Qty: 1 INHALER | Refills: 2 | Status: SHIPPED | OUTPATIENT
Start: 2020-05-12

## 2020-05-12 ASSESSMENT — FIBROSIS 4 INDEX: FIB4 SCORE: 1.9

## 2020-05-12 NOTE — TELEPHONE ENCOUNTER
1. Caller Name: Aria Solis                        Call Back Number: [phone]  [facility] PCP or Specialty Provider: Yes Dr. Nichole Sarkar        2.  Does patient have any active symptoms of respiratory illness? Yes, the patient reports the following respiratory symptoms: cough and shortness of breath or difficulty breathing.    3.  Does patient have any comoribidities? None   A. Fib    4.  Has the patient traveled in the last 14 days OR had any known contact with someone who is suspected or confirmed to have COVID-19?  No.    5. Disposition: Advised to go to   Due to cough x 1 month with SOB and chest discomfort  Note routed to [facility] Provider: CHARISMA only.

## 2020-05-13 ENCOUNTER — TELEPHONE (OUTPATIENT)
Dept: EMERGENCY MEDICINE | Facility: MEDICAL CENTER | Age: 80
End: 2020-05-13

## 2020-05-13 NOTE — PROGRESS NOTES
Chief Complaint:    Chief Complaint   Patient presents with   • Cough     x 1 month. Cough, chest discomfort, S.O.B..        History of Present Illness:    This is a new problem. For 1 month, she has non-productive cough and shortness of breath, not getting better. No fever or purulent mucus. She does not have MDI to use. She has history of one episode of CHF. Left ventricular ejection fraction is visually estimated to be 35-40% on ECHO (5/7/19). Cardiomegaly was seen on CXR (1/8/16).      Review of Systems:    Constitutional: Negative for fever, chills, and diaphoresis.   Eyes: Negative for change in vision, photophobia, pain, redness, and discharge.  ENT: Negative for ear pain, ear discharge, hearing loss, tinnitus, nasal congestion, nosebleeds, and sore throat.    Respiratory: See HPI.  Cardiovascular: Has chronic a-fib. Negative for chest pain or leg swelling.   Gastrointestinal: Negative for abdominal pain, nausea, vomiting, diarrhea, constipation, blood in stool, and melena.   Genitourinary: Negative for dysuria, urinary urgency, urinary frequency, hematuria, and flank pain.   Musculoskeletal: No new symptoms.   Skin: Negative for rash and itching.   Neurological: No new symptoms.   Endo: Negative for polydipsia.   Heme: Does not bruise/bleed easily.   Psychiatric/Behavioral: Negative for depression, suicidal ideas, hallucinations, memory loss and substance abuse. The patient is not nervous/anxious and does not have insomnia.        Past Medical History:    Past Medical History:   Diagnosis Date   • Atrial fibrillation ([facility]) February 2014    New onset atrial fibrillation.   • Back pain    • CAD (coronary artery disease) February 2014    ACS. Coronary angiogram with normal LVEF, proximal LAD 40-50% stenosis, distal LAD 40% stenosis.   • Cerebellar stroke, acute ([facility]) February 2014    CT scan with acute cerebellar stroke, managed by neurology.   • Cholesterol blood decreased    • Chronic anticoagulation    • H/O  total knee replacement     Bilateral   • Hyperlipemia    • Insomnia    • Medical home    • Migraine without aura, with intractable migraine, so stated, without mention of status migrainosus    • Osteoarthritis of more than one site    • Postmenopausal    • Sleep apnea     On CPAP   • Stroke (HCC)      Past Surgical History:    Past Surgical History:   Procedure Laterality Date   • NEURO DEST FACET L/S W/IG SNGL  2014    Performed by Nelly Lomeli at SURGERY SURG[facility] ORS   • NEURO DEST FACET L/S W/IG ADDL  2014    Performed by Nelly Lomeli at SURGERY SURG[facility] ORS   • NEURO DEST FACET L/S W/IG ADDL  2014    Performed by Nelly Lomeli at SURGERY SURGICAL [facility] ORS   • OTHER ORTHOPEDIC SURGERY  2012    Spinal stimulator   • OTHER ORTHOPEDIC SURGERY      Right ankle surgery   • KNEE REPLACEMENT, TOTAL  1996    Bilateral   • LUMBAR LAMINECTOMY DISKECTOMY      L5 and L6 laminectomy     Social History:    Social History     Socioeconomic History   • Marital status:      Spouse name: Not on file   • Number of children: Not on file   • Years of education: Not on file   • Highest education level: Not on file   Occupational History   • Not on file   Social Needs   • Financial resource strain: Not on file   • Food insecurity     Worry: Not on file     Inability: Not on file   • Transportation needs     Medical: Not on file     Non-medical: Not on file   Tobacco Use   • Smoking status: Former Smoker     Packs/day: 3.00     Years: 10.00     Pack years: 30.00     Types: Cigarettes     Last attempt to quit: 1968     Years since quittin.6   • Smokeless tobacco: Never Used   Substance and Sexual Activity   • Alcohol use: No     Alcohol/week: 0.0 oz   • Drug use: No   • Sexual activity: Not Currently     Partners: Male   Lifestyle   • Physical activity     Days per week: Not on file     Minutes per session: Not on file   • Stress: Not on file   Relationships    • Social connections     Talks on phone: Not on file     Gets together: Not on file     Attends Taoist service: Not on file     Active member of club or organization: Not on file     Attends meetings of clubs or organizations: Not on file     Relationship status: Not on file   • Intimate partner violence     Fear of current or ex partner: Not on file     Emotionally abused: Not on file     Physically abused: Not on file     Forced sexual activity: Not on file   Other Topics Concern   • Not on file   Social History Narrative   • Not on file     Family History:    Family History   Problem Relation Age of Onset   • Other Father         trauma   • Heart Disease Neg Hx    • Heart Attack Neg Hx    • Heart Failure Neg Hx    • Hyperlipidemia Neg Hx      Medications:    Current Outpatient Medications on File Prior to Visit   Medication Sig Dispense Refill   • metoprolol SR (TOPROL XL) 100 MG TABLET SR 24 HR TAKE 1 TABLET DAILY 90 Tab 3   • lisinopril (PRINIVIL) 2.5 MG Tab TAKE 1 TABLET DAILY 90 Tab 4   • divalproex ER (DEPAKOTE ER) 500 MG TABLET SR 24 HR Take 1 Tab by mouth 2 Times a Day. 180 Tab 3   • gabapentin (NEURONTIN) 600 MG tablet Take 1 Tab by mouth 2 times a day. 180 Tab 3   • atorvastatin (LIPITOR) 10 MG Tab Take 1 Tab by mouth every bedtime. 90 Tab 3   • aspirin (ASA) 81 MG Chew Tab chewable tablet Take 1 Tab by mouth every day. 100 Tab    • fluticasone (FLONASE) 50 MCG/ACT nasal spray Spray 2 Sprays in nose every day. 16 g 3   • cyclosporin (RESTASIS) 0.05 % ophthalmic emulsion Place 1 Drop in both eyes 2 times a day. 3 Each 3   • Multiple Vitamins-Minerals (OCUVITE ADULT 50+ PO) Take  by mouth every day.       No current facility-administered medications on file prior to visit.      Allergies:    Allergies   Allergen Reactions   • Darvon [Propoxyphene Hcl]    • Gluten Meal        Vitals:    Vitals:    05/12/20 1757   BP: 108/56   Pulse: 93   Resp: 14   Temp: 36.7 °C (98 °F)   TempSrc: Temporal   SpO2: 95%  "  Weight: 85.7 kg (189 lb)   Height: 1.727 m (5' 8\")       Physical Exam:    Constitutional: Vital signs reviewed. Appears well-developed and well-nourished. Occl cough. No acute distress.   Eyes: Sclera white, conjunctivae clear.  ENT: External ears normal. External auditory canals normal without discharge. TMs translucent and non-bulging. Hearing normal. Nasal mucosa pink. Lips are normal. Oral mucosa pink and moist. Posterior pharynx: WNL.  Neck: Neck supple.   Cardiovascular: Irregularly, irregular rate and rhythm, c/w known a-fib. No murmur. No pitting edema lower legs.  Pulmonary/Chest: Respirations non-labored. Clear to auscultation bilaterally.  Lymph: Cervical nodes without tenderness or enlargement.  Musculoskeletal: Normal gait. No muscular atrophy or weakness.  Neurological: Alert and oriented to person, place, and time. Muscle tone normal. Coordination normal.   Skin: No rashes or lesions. Warm, dry, normal turgor.  Psychiatric: Normal mood and affect. Behavior is normal. Judgment and thought content normal.       Diagnostics:    DX-CHEST-2 VIEWS   Order: [id number]   Status:  Final result   Visible to patient:  No (Not Released) Next appt:  None Dx:  Shortness of breath; Cough   Details     Reading Physician Reading Date Result Priority   Philly Melton M.D.  [phone] 5/12/2020 Urgent Care      Narrative & Impression        5/12/2020 6:20 PM     HISTORY/REASON FOR EXAM:  Shortness of breath and cough        TECHNIQUE/EXAM DESCRIPTION AND NUMBER OF VIEWS:  Two views of the chest.     COMPARISON:  1/8/2016     FINDINGS:     The cardiac silhouette  and mediastinal contours are stable.     No discrete opacity, pleural fluid or pneumothorax.     Spinal stimulator leads project over the thoracic spine.           IMPRESSION:     No acute cardiopulmonary findings.           I personally reviewed the images. Rad report reviewed with her and copy of report to her.      Assessment / Plan:    1. Cough  - " DX-CHEST-2 VIEWS; Future    2. Shortness of breath  - DX-CHEST-2 VIEWS; Future    3. Acute lower respiratory infection  - doxycycline (VIBRAMYCIN) 100 MG Tab; Take 1 Tab by mouth 2 times a day for 7 days.  Dispense: 14 Tab; Refill: 0    4. Acute bronchitis, unspecified organism  - predniSONE (DELTASONE) 20 MG Tab; Take 1 Tab by mouth every day for 5 days.  Dispense: 5 Tab; Refill: 0  - albuterol 108 (90 Base) MCG/ACT Aero Soln inhalation aerosol; 2 PUFFS EVERY 4 HOURS ONLY IF NEEDED FOR COUGH, WHEEZING, OR SHORTNESS OF BREATH.  Dispense: 1 Inhaler; Refill: 2      Discussed with her DDX, management options, and risks, benefits, and alternatives to treatment plan agreed upon.    Doxycycline, Prednisone, and MDI were helpful for similar problem 4/22/19 and offered same. She would like.    Agreeable to medications prescribed.    Discussed expected course of duration, time for improvement, and to seek follow-up in Emergency Room, urgent care, or with PCP if getting worse at any time or not improving within expected time frame.

## 2020-05-13 NOTE — TELEPHONE ENCOUNTER
Reached pt on first attempt at number listed.    Pts symptoms are improving. Pt is waiting for daughter to take her to the pharmacy so she  her medications. I told pt to call nurse triage line if she was not able to  her medications.    Pt did not have any questions about their discharge instructions, they read and understood the instructions given to them during their discharge from the [facility] ED.    Pt did not have any questions about the CDC self-isolation guidelines. Reviewed the below with the patient and they stated they were following these guidelines  a. Staying home and frequently washing your hands, and disinfecting commonly used household items (such as cellphone, remote, door handles, tabletops, faucets, etc.)  b. You are wearing a mask or some sort of effective personal protection equipment (I can provide resources for them if needed), as well as covering your cough and avoiding sharing household items.   c. You are staying in your own room; besides caretaker coming and going to help-out, you are sleeping in your own space away from others.    Reminded pt that we are available if any questions arise; they should return to [facility] urgent care if their sx worsen; and call 911 if they have a medical emergency.

## 2020-05-29 DIAGNOSIS — G43.019 INTRACTABLE MIGRAINE WITHOUT AURA AND WITHOUT STATUS MIGRAINOSUS: ICD-10-CM

## 2020-05-29 RX ORDER — DIVALPROEX SODIUM 500 MG/1
500 TABLET, EXTENDED RELEASE ORAL 2 TIMES DAILY
Qty: 180 TAB | Refills: 3 | Status: SHIPPED | OUTPATIENT
Start: 2020-05-29

## 2021-01-11 DIAGNOSIS — Z23 NEED FOR VACCINATION: ICD-10-CM

## 2021-02-03 ENCOUNTER — IMMUNIZATION (OUTPATIENT)
Dept: FAMILY PLANNING/WOMEN'S HEALTH CLINIC | Facility: IMMUNIZATION CENTER | Age: 81
End: 2021-02-03
Attending: INTERNAL MEDICINE
Payer: MEDICARE

## 2021-02-03 DIAGNOSIS — Z23 NEED FOR VACCINATION: ICD-10-CM

## 2021-02-03 DIAGNOSIS — Z23 ENCOUNTER FOR VACCINATION: Primary | ICD-10-CM

## 2021-02-03 PROCEDURE — 0001A PFIZER SARS-COV-2 VACCINE: CPT

## 2021-02-03 PROCEDURE — 91300 PFIZER SARS-COV-2 VACCINE: CPT

## 2021-02-24 ENCOUNTER — IMMUNIZATION (OUTPATIENT)
Dept: FAMILY PLANNING/WOMEN'S HEALTH CLINIC | Facility: IMMUNIZATION CENTER | Age: 81
End: 2021-02-24
Attending: INTERNAL MEDICINE
Payer: MEDICARE

## 2021-02-24 DIAGNOSIS — Z23 ENCOUNTER FOR VACCINATION: Primary | ICD-10-CM

## 2021-02-24 PROCEDURE — 0002A PFIZER SARS-COV-2 VACCINE: CPT

## 2021-02-24 PROCEDURE — 91300 PFIZER SARS-COV-2 VACCINE: CPT
